# Patient Record
Sex: MALE | Race: WHITE | HISPANIC OR LATINO | Employment: FULL TIME | URBAN - METROPOLITAN AREA
[De-identification: names, ages, dates, MRNs, and addresses within clinical notes are randomized per-mention and may not be internally consistent; named-entity substitution may affect disease eponyms.]

---

## 2017-12-18 ENCOUNTER — OFFICE VISIT (OUTPATIENT)
Dept: GASTROENTEROLOGY | Facility: CLINIC | Age: 27
End: 2017-12-18
Attending: INTERNAL MEDICINE
Payer: COMMERCIAL

## 2017-12-18 VITALS
TEMPERATURE: 98.7 F | HEART RATE: 69 BPM | DIASTOLIC BLOOD PRESSURE: 80 MMHG | HEIGHT: 69 IN | BODY MASS INDEX: 23.36 KG/M2 | SYSTOLIC BLOOD PRESSURE: 133 MMHG | OXYGEN SATURATION: 98 % | WEIGHT: 157.7 LBS

## 2017-12-18 DIAGNOSIS — B18.1 CHRONIC VIRAL HEPATITIS B WITHOUT DELTA AGENT AND WITHOUT COMA (H): Primary | ICD-10-CM

## 2017-12-18 DIAGNOSIS — B18.1 CHRONIC VIRAL HEPATITIS B WITHOUT DELTA AGENT AND WITHOUT COMA (H): ICD-10-CM

## 2017-12-18 LAB
ALBUMIN SERPL-MCNC: 4.2 G/DL (ref 3.4–5)
ALP SERPL-CCNC: 98 U/L (ref 40–150)
ALT SERPL W P-5'-P-CCNC: 46 U/L (ref 0–70)
ANION GAP SERPL CALCULATED.3IONS-SCNC: 6 MMOL/L (ref 3–14)
AST SERPL W P-5'-P-CCNC: 29 U/L (ref 0–45)
BILIRUB DIRECT SERPL-MCNC: 0.2 MG/DL (ref 0–0.2)
BILIRUB SERPL-MCNC: 1 MG/DL (ref 0.2–1.3)
BUN SERPL-MCNC: 15 MG/DL (ref 7–30)
CALCIUM SERPL-MCNC: 8.7 MG/DL (ref 8.5–10.1)
CHLORIDE SERPL-SCNC: 105 MMOL/L (ref 94–109)
CO2 SERPL-SCNC: 29 MMOL/L (ref 20–32)
CREAT SERPL-MCNC: 0.95 MG/DL (ref 0.66–1.25)
ERYTHROCYTE [DISTWIDTH] IN BLOOD BY AUTOMATED COUNT: 11.8 % (ref 10–15)
GFR SERPL CREATININE-BSD FRML MDRD: >90 ML/MIN/1.7M2
GLUCOSE SERPL-MCNC: 97 MG/DL (ref 70–99)
HCT VFR BLD AUTO: 44.8 % (ref 40–53)
HGB BLD-MCNC: 15.5 G/DL (ref 13.3–17.7)
INR PPP: 1.04 (ref 0.86–1.14)
MCH RBC QN AUTO: 31 PG (ref 26.5–33)
MCHC RBC AUTO-ENTMCNC: 34.6 G/DL (ref 31.5–36.5)
MCV RBC AUTO: 90 FL (ref 78–100)
PLATELET # BLD AUTO: 179 10E9/L (ref 150–450)
POTASSIUM SERPL-SCNC: 3.7 MMOL/L (ref 3.4–5.3)
PROT SERPL-MCNC: 7.6 G/DL (ref 6.8–8.8)
RBC # BLD AUTO: 5 10E12/L (ref 4.4–5.9)
SODIUM SERPL-SCNC: 140 MMOL/L (ref 133–144)
WBC # BLD AUTO: 7.2 10E9/L (ref 4–11)

## 2017-12-18 PROCEDURE — 87517 HEPATITIS B DNA QUANT: CPT | Performed by: INTERNAL MEDICINE

## 2017-12-18 PROCEDURE — 85027 COMPLETE CBC AUTOMATED: CPT | Performed by: INTERNAL MEDICINE

## 2017-12-18 PROCEDURE — 86692 HEPATITIS DELTA AGENT ANTBDY: CPT | Performed by: INTERNAL MEDICINE

## 2017-12-18 PROCEDURE — 99212 OFFICE O/P EST SF 10 MIN: CPT | Mod: ZF

## 2017-12-18 PROCEDURE — 36415 COLL VENOUS BLD VENIPUNCTURE: CPT | Performed by: INTERNAL MEDICINE

## 2017-12-18 PROCEDURE — 85610 PROTHROMBIN TIME: CPT | Performed by: INTERNAL MEDICINE

## 2017-12-18 PROCEDURE — 80076 HEPATIC FUNCTION PANEL: CPT | Performed by: INTERNAL MEDICINE

## 2017-12-18 PROCEDURE — 80048 BASIC METABOLIC PNL TOTAL CA: CPT | Performed by: INTERNAL MEDICINE

## 2017-12-18 RX ORDER — ASCORBIC ACID 1000 MG
1 TABLET ORAL
COMMUNITY
End: 2021-04-02

## 2017-12-18 ASSESSMENT — PAIN SCALES - GENERAL: PAINLEVEL: NO PAIN (0)

## 2017-12-18 NOTE — MR AVS SNAPSHOT
"              After Visit Summary   12/18/2017    Saqib Holley    MRN: [id number]           Patient Information     Date Of Birth          1990        Visit Information        Provider Department      12/18/2017 4:45 PM Tripp Corona MD [facility] Hepatology        Today's Diagnoses     Chronic viral hepatitis B without delta agent and without coma (H) [B18.1]    -  1       Follow-ups after your visit        Follow-up notes from your care team     Return in about 1 year (around 12/18/2018).      Who to contact     If you have questions or need follow up information about today's clinic visit or your schedule please contact [facility]PAT[facility] directly at [phone].  Normal or non-critical lab and imaging results will be communicated to you by MyChart, letter or phone within 4 business days after the clinic has received the results. If you do not hear from us within 7 days, please contact the clinic through [employer] or phone. If you have a critical or abnormal lab result, we will notify you by phone as soon as possible.  Submit refill requests through [employer] or call your pharmacy and they will forward the refill request to us. Please allow 3 business days for your refill to be completed.          Additional Information About Your Visit        MyChart Information     [employer] gives you secure access to your electronic health record. If you see a primary care provider, you can also send messages to your care team and make appointments. If you have questions, please call your primary care clinic.  If you do not have a primary care provider, please call [phone] and they will assist you.        Care EveryWhere ID     This is your Care EveryWhere ID. This could be used by other organizations to access your [place] medical records  [id number]        Your Vitals Were     Pulse Temperature Height Pulse Oximetry BMI (Body Mass Index)       69 98.7  F (37.1  C) (Oral) 1.75 m (5' 8.9\") 98% 23.36 kg/m2        " Blood Pressure from Last 3 Encounters:   12/18/17 133/80   12/12/16 125/80   11/29/16 123/76    Weight from Last 3 Encounters:   12/18/17 71.5 kg (157 lb 11.2 oz)   12/12/16 75.8 kg (167 lb)   11/29/16 74.9 kg (165 lb 1.6 oz)              Today, you had the following     No orders found for display       Primary Care Provider Office Phone # Fax #    Osmar Alcantara [phone]       [address]  [facility] [address]        Equal Access to Services     [facility]: Hadii aad ku hadasho Soomaali, waaxda luqadaha, qaybta kaalmada adeegyada, flakito zhou adejames black . So [facility] [phone].    ATENCIÓN: Si habla español, tiene a madrid disposición servicios gratuitos de asistencia lingüística. Llame al [phone].    We comply with applicable federal civil rights laws and [place] laws. We do not discriminate on the basis of race, color, national origin, age, disability, sex, sexual orientation, or gender identity.            Thank you!     Thank you for choosing [facility]PAT[facility]  for your care. Our goal is always to provide you with excellent care. Hearing back from our patients is one way we can continue to improve our services. Please take a few minutes to complete the written survey that you may receive in the mail after your visit with us. Thank you!             Your Updated Medication List - Protect others around you: Learn how to safely use, store and throw away your medicines at www.disposemymeds.org.          This list is accurate as of: 12/18/17 11:59 PM.  Always use your most recent med list.                   Brand Name Dispense Instructions for use Diagnosis    Ginkgo Biloba 40 MG Tabs      Take 1 tablet by mouth        MULTIVITAMIN ADULTS PO      Take 1 tablet by mouth daily

## 2017-12-18 NOTE — NURSING NOTE
Chief Complaint   Patient presents with     RECHECK     Hepatitis C     Pt roomed, vitals, meds, and allergies reviewed with pt. Pt ready for provider.  Cheng Franco, CMA

## 2017-12-18 NOTE — LETTER
12/18/2017      RE: Saqib Holley  [address]  [facility] [address]       HISTORY OF PRESENT ILLNESS:  I had the pleasure of seeing Saqib Holley for followup in the Liver Clinic at the [facility] on 12/18/2017.  Mr. Holley returns for followup of chronic hepatitis B.      I saw him last year.  He had mildly increased transaminases with a borderline HBV DNA level.  He is hepatitis BE antigen negative and E antibody positive.      He is largely unchanged.  He denies any abdominal pain, itching or skin rash or fatigue.  He denies any increased abdominal girth or lower extremity edema.  He denies any fevers or chills, cough or shortness of breath.  He denies any nausea, vomiting, diarrhea or constipation.  His appetite has been good, and his weight has been stable.  Since he was last seen he has .     Current Outpatient Prescriptions   Medication     Multiple Vitamins-Minerals (MULTIVITAMIN ADULTS PO)     Ginkgo Biloba 40 MG TABS     No current facility-administered medications for this visit.      B/P: 133/80, T: 98.7, P: 69, R: Data Unavailable    HEENT exam shows no scleral icterus and no temporal muscle wasting.  His chest is clear.  His abdominal exam shows no increase in girth.  No masses or tenderness to palpation are present.  His liver is 10 cm in span without left lobe enlargement.  No spleen tip is palpable, and extremity exam shows no edema.  Skin exam shows no stigmata of chronic liver disease.  Neurologic exam shows no asterixis.     Recent Results (from the past 168 hour(s))   Hepatic panel [LAB20]    Collection Time: 12/18/17  4:03 PM   Result Value Ref Range    Bilirubin Direct 0.2 0.0 - 0.2 mg/dL    Bilirubin Total 1.0 0.2 - 1.3 mg/dL    Albumin 4.2 3.4 - 5.0 g/dL    Protein Total 7.6 6.8 - 8.8 g/dL    Alkaline Phosphatase 98 40 - 150 U/L    ALT 46 0 - 70 U/L    AST 29 0 - 45 U/L   Basic metabolic panel [LAB15]    Collection Time: 12/18/17  4:03 PM    Result Value Ref Range    Sodium 140 133 - 144 mmol/L    Potassium 3.7 3.4 - 5.3 mmol/L    Chloride 105 94 - 109 mmol/L    Carbon Dioxide 29 20 - 32 mmol/L    Anion Gap 6 3 - 14 mmol/L    Glucose 97 70 - 99 mg/dL    Urea Nitrogen 15 7 - 30 mg/dL    Creatinine 0.95 0.66 - 1.25 mg/dL    GFR Estimate >90 >60 mL/min/1.7m2    GFR Estimate If Black >90 >60 mL/min/1.7m2    Calcium 8.7 8.5 - 10.1 mg/dL   CBC with platelets [[id number]]    Collection Time: 12/18/17  4:03 PM   Result Value Ref Range    WBC 7.2 4.0 - 11.0 10e9/L    RBC Count 5.00 4.4 - 5.9 10e12/L    Hemoglobin 15.5 13.3 - 17.7 g/dL    Hematocrit 44.8 40.0 - 53.0 %    MCV 90 78 - 100 fl    MCH 31.0 26.5 - 33.0 pg    MCHC 34.6 31.5 - 36.5 g/dL    RDW 11.8 10.0 - 15.0 %    Platelet Count 179 150 - 450 10e9/L   INR [[id number]]    Collection Time: 12/18/17  4:03 PM   Result Value Ref Range    INR 1.04 0.86 - 1.14      My impression is that Mr. Holley has chronic hepatitis BE antigen (-), likely in the immune inactive phase of the disease.  His transaminase activities are actually better at this visit than they were last time and the only thing pending is his HBV DNA level.  If that is the same to improved, I certainly will not consider him for treatment and will simply see him back in the clinic in 1 year.      Thank you very much for allowing me to participate in the care of this patient.  If you have any questions regarding my recommendations, please do not hesitate to contact me.   Tirpp Corona MD

## 2017-12-18 NOTE — PROGRESS NOTES
HISTORY OF PRESENT ILLNESS:  I had the pleasure of seeing Saqib Holley for followup in the Liver Clinic at the [facility] on 12/18/2017.  Mr. Holley returns for followup of chronic hepatitis B.      I saw him last year.  He had mildly increased transaminases with a borderline HBV DNA level.  He is hepatitis BE antigen negative and E antibody positive.      He is largely unchanged.  He denies any abdominal pain, itching or skin rash or fatigue.  He denies any increased abdominal girth or lower extremity edema.  He denies any fevers or chills, cough or shortness of breath.  He denies any nausea, vomiting, diarrhea or constipation.  His appetite has been good, and his weight has been stable.  Since he was last seen he has .     Current Outpatient Prescriptions   Medication     Multiple Vitamins-Minerals (MULTIVITAMIN ADULTS PO)     Ginkgo Biloba 40 MG TABS     No current facility-administered medications for this visit.      B/P: 133/80, T: 98.7, P: 69, R: Data Unavailable    HEENT exam shows no scleral icterus and no temporal muscle wasting.  His chest is clear.  His abdominal exam shows no increase in girth.  No masses or tenderness to palpation are present.  His liver is 10 cm in span without left lobe enlargement.  No spleen tip is palpable, and extremity exam shows no edema.  Skin exam shows no stigmata of chronic liver disease.  Neurologic exam shows no asterixis.     Recent Results (from the past 168 hour(s))   Hepatic panel [LAB20]    Collection Time: 12/18/17  4:03 PM   Result Value Ref Range    Bilirubin Direct 0.2 0.0 - 0.2 mg/dL    Bilirubin Total 1.0 0.2 - 1.3 mg/dL    Albumin 4.2 3.4 - 5.0 g/dL    Protein Total 7.6 6.8 - 8.8 g/dL    Alkaline Phosphatase 98 40 - 150 U/L    ALT 46 0 - 70 U/L    AST 29 0 - 45 U/L   Basic metabolic panel [LAB15]    Collection Time: 12/18/17  4:03 PM   Result Value Ref Range    Sodium 140 133 - 144 mmol/L    Potassium 3.7 3.4 - 5.3  mmol/L    Chloride 105 94 - 109 mmol/L    Carbon Dioxide 29 20 - 32 mmol/L    Anion Gap 6 3 - 14 mmol/L    Glucose 97 70 - 99 mg/dL    Urea Nitrogen 15 7 - 30 mg/dL    Creatinine 0.95 0.66 - 1.25 mg/dL    GFR Estimate >90 >60 mL/min/1.7m2    GFR Estimate If Black >90 >60 mL/min/1.7m2    Calcium 8.7 8.5 - 10.1 mg/dL   CBC with platelets [[id number]]    Collection Time: 12/18/17  4:03 PM   Result Value Ref Range    WBC 7.2 4.0 - 11.0 10e9/L    RBC Count 5.00 4.4 - 5.9 10e12/L    Hemoglobin 15.5 13.3 - 17.7 g/dL    Hematocrit 44.8 40.0 - 53.0 %    MCV 90 78 - 100 fl    MCH 31.0 26.5 - 33.0 pg    MCHC 34.6 31.5 - 36.5 g/dL    RDW 11.8 10.0 - 15.0 %    Platelet Count 179 150 - 450 10e9/L   INR [[id number]]    Collection Time: 12/18/17  4:03 PM   Result Value Ref Range    INR 1.04 0.86 - 1.14      My impression is that Mr. Holley has chronic hepatitis BE antigen (-), likely in the immune inactive phase of the disease.  His transaminase activities are actually better at this visit than they were last time and the only thing pending is his HBV DNA level.  If that is the same to improved, I certainly will not consider him for treatment and will simply see him back in the clinic in 1 year.      Thank you very much for allowing me to participate in the care of this patient.  If you have any questions regarding my recommendations, please do not hesitate to contact me.       Tripp Corona MD      Professor of Medicine  [employer]      Executive Medical Director, Solid Organ Transplant Program  [facility]

## 2017-12-19 LAB
HBV DNA SERPL NAA+PROBE-ACNC: ABNORMAL [IU]/ML
HBV DNA SERPL NAA+PROBE-LOG IU: 4.3 {LOG_IU}/ML

## 2017-12-20 LAB — HDV AB SER QL: NEGATIVE

## 2018-12-10 ENCOUNTER — OFFICE VISIT (OUTPATIENT)
Dept: GASTROENTEROLOGY | Facility: CLINIC | Age: 28
End: 2018-12-10
Attending: INTERNAL MEDICINE

## 2018-12-10 VITALS
HEIGHT: 68 IN | SYSTOLIC BLOOD PRESSURE: 131 MMHG | TEMPERATURE: 98.8 F | DIASTOLIC BLOOD PRESSURE: 79 MMHG | BODY MASS INDEX: 23.73 KG/M2 | WEIGHT: 156.6 LBS | HEART RATE: 72 BPM

## 2018-12-10 DIAGNOSIS — B18.1 CHRONIC VIRAL HEPATITIS B WITHOUT DELTA AGENT AND WITHOUT COMA (H): ICD-10-CM

## 2018-12-10 DIAGNOSIS — B18.1 CHRONIC VIRAL HEPATITIS B WITHOUT DELTA AGENT AND WITHOUT COMA (H): Primary | ICD-10-CM

## 2018-12-10 LAB
ALBUMIN SERPL-MCNC: 4.2 G/DL (ref 3.4–5)
ALP SERPL-CCNC: 99 U/L (ref 40–150)
ALT SERPL W P-5'-P-CCNC: 63 U/L (ref 0–70)
ANION GAP SERPL CALCULATED.3IONS-SCNC: 8 MMOL/L (ref 3–14)
AST SERPL W P-5'-P-CCNC: 39 U/L (ref 0–45)
BILIRUB DIRECT SERPL-MCNC: 0.2 MG/DL (ref 0–0.2)
BILIRUB SERPL-MCNC: 1.2 MG/DL (ref 0.2–1.3)
BUN SERPL-MCNC: 12 MG/DL (ref 7–30)
CALCIUM SERPL-MCNC: 8.8 MG/DL (ref 8.5–10.1)
CHLORIDE SERPL-SCNC: 102 MMOL/L (ref 94–109)
CO2 SERPL-SCNC: 28 MMOL/L (ref 20–32)
CREAT SERPL-MCNC: 0.99 MG/DL (ref 0.66–1.25)
ERYTHROCYTE [DISTWIDTH] IN BLOOD BY AUTOMATED COUNT: 11.9 % (ref 10–15)
GFR SERPL CREATININE-BSD FRML MDRD: 90 ML/MIN/1.7M2
GLUCOSE SERPL-MCNC: 78 MG/DL (ref 70–99)
HCT VFR BLD AUTO: 45.3 % (ref 40–53)
HGB BLD-MCNC: 15.7 G/DL (ref 13.3–17.7)
INR PPP: 1.01 (ref 0.86–1.14)
MCH RBC QN AUTO: 31.2 PG (ref 26.5–33)
MCHC RBC AUTO-ENTMCNC: 34.7 G/DL (ref 31.5–36.5)
MCV RBC AUTO: 90 FL (ref 78–100)
PLATELET # BLD AUTO: 201 10E9/L (ref 150–450)
POTASSIUM SERPL-SCNC: 3.6 MMOL/L (ref 3.4–5.3)
PROT SERPL-MCNC: 8 G/DL (ref 6.8–8.8)
RBC # BLD AUTO: 5.03 10E12/L (ref 4.4–5.9)
SODIUM SERPL-SCNC: 139 MMOL/L (ref 133–144)
WBC # BLD AUTO: 6.4 10E9/L (ref 4–11)

## 2018-12-10 PROCEDURE — 80076 HEPATIC FUNCTION PANEL: CPT | Performed by: INTERNAL MEDICINE

## 2018-12-10 PROCEDURE — 85610 PROTHROMBIN TIME: CPT | Performed by: INTERNAL MEDICINE

## 2018-12-10 PROCEDURE — 85027 COMPLETE CBC AUTOMATED: CPT | Performed by: INTERNAL MEDICINE

## 2018-12-10 PROCEDURE — G0463 HOSPITAL OUTPT CLINIC VISIT: HCPCS | Mod: ZF

## 2018-12-10 PROCEDURE — 80048 BASIC METABOLIC PNL TOTAL CA: CPT | Performed by: INTERNAL MEDICINE

## 2018-12-10 PROCEDURE — 36415 COLL VENOUS BLD VENIPUNCTURE: CPT | Performed by: INTERNAL MEDICINE

## 2018-12-10 PROCEDURE — 87517 HEPATITIS B DNA QUANT: CPT | Performed by: INTERNAL MEDICINE

## 2018-12-10 RX ORDER — CHOLECALCIFEROL (VITAMIN D3) 50 MCG
1 TABLET ORAL DAILY
COMMUNITY
End: 2024-06-06

## 2018-12-10 RX ORDER — MULTIVITAMIN WITH IRON
1 TABLET ORAL DAILY
COMMUNITY
End: 2024-06-06

## 2018-12-10 ASSESSMENT — MIFFLIN-ST. JEOR: SCORE: 1659.83

## 2018-12-10 ASSESSMENT — PAIN SCALES - GENERAL: PAINLEVEL: MILD PAIN (2)

## 2018-12-10 NOTE — PROGRESS NOTES
"I had the pleasure of seeing Saqib Holley for followup in the Liver Clinic at the [facility] [place] on 12/10/2018.  Mr. Holley returns for followup of chronic hepatitis B.  He has been to date in the immune inactive phase.      He feels well at this visit.  He denies any abdominal pain, itching or skin rash or fatigue.  He denies any increased abdominal girth or lower extremity edema.  He denies any fevers or chills, cough or shortness of breath.  He denies any nausea, vomiting, diarrhea or constipation.  His appetite has been good, and his weight has been stable.  There have been no other new events since he was last seen.     Current Outpatient Medications   Medication     magnesium 250 MG tablet     Multiple Vitamins-Minerals (MULTIVITAMIN ADULTS PO)     vitamin D3 (CHOLECALCIFEROL) 2000 units tablet     Ginkgo Biloba 40 MG TABS     No current facility-administered medications for this visit.      Vital signs:  Temp: 98.8  F (37.1  C) Temp src: Oral BP: 131/79 Pulse: 72           Height: 172.7 cm (5' 8\") Weight: 71 kg (156 lb 9.6 oz)  Estimated body mass index is 23.81 kg/m  as calculated from the following:    Height as of this encounter: 1.727 m (5' 8\").    Weight as of this encounter: 71 kg (156 lb 9.6 oz).    HEENT exam shows no scleral icterus or temporal muscle wasting.  His chest is clear.  His abdominal exam shows no increase in girth.  No masses or tenderness to palpation are present.  His liver is 10 cm in span without left lobe enlargement.  No spleen tip is palpable, and extremity exam shows no edema.  Skin exam shows no stigmata of chronic liver disease.  Neurologic exam shows no asterixis.     Recent Results (from the past 168 hour(s))   CBC with platelets [[id number]]    Collection Time: 12/10/18  4:14 PM   Result Value Ref Range    WBC 6.4 4.0 - 11.0 10e9/L    RBC Count 5.03 4.4 - 5.9 10e12/L    Hemoglobin 15.7 13.3 - 17.7 g/dL    Hematocrit 45.3 40.0 - 53.0 %    MCV 90 78 - " 100 fl    MCH 31.2 26.5 - 33.0 pg    MCHC 34.7 31.5 - 36.5 g/dL    RDW 11.9 10.0 - 15.0 %    Platelet Count 201 150 - 450 10e9/L   INR [[id number]]    Collection Time: 12/10/18  4:14 PM   Result Value Ref Range    INR 1.01 0.86 - 1.14   Basic metabolic panel [LAB15]    Collection Time: 12/10/18  4:14 PM   Result Value Ref Range    Sodium 139 133 - 144 mmol/L    Potassium 3.6 3.4 - 5.3 mmol/L    Chloride 102 94 - 109 mmol/L    Carbon Dioxide 28 20 - 32 mmol/L    Anion Gap 8 3 - 14 mmol/L    Glucose 78 70 - 99 mg/dL    Urea Nitrogen 12 7 - 30 mg/dL    Creatinine 0.99 0.66 - 1.25 mg/dL    GFR Estimate 90 >60 mL/min/1.7m2    GFR Estimate If Black >90 >60 mL/min/1.7m2    Calcium 8.8 8.5 - 10.1 mg/dL   Hepatic panel [LAB20]    Collection Time: 12/10/18  4:14 PM   Result Value Ref Range    Bilirubin Direct 0.2 0.0 - 0.2 mg/dL    Bilirubin Total 1.2 0.2 - 1.3 mg/dL    Albumin 4.2 3.4 - 5.0 g/dL    Protein Total 8.0 6.8 - 8.8 g/dL    Alkaline Phosphatase 99 40 - 150 U/L    ALT 63 0 - 70 U/L    AST 39 0 - 45 U/L      My impression is that Mr. Holley has chronic hepatitis B.  His transaminases remain normal, and his HBV DNA level is pending.  I will follow up on that when it is available, and assuming it remains less than 20,000, we will continue on this course of no treatment for the time being and simply seeing him once a year.  He did request that we set the appointments for June, so I will see him back in the clinic in 6 months and then 1 year thereafter.      Thank you very much for allowing me to participate in the care of this patient.  If you have any questions regarding my recommendations, please do not hesitate to contact me.       Tripp Corona MD      Professor of Medicine  [facility] [employer]      Executive Medical Director, Solid Organ Transplant Program  [facility]

## 2018-12-10 NOTE — LETTER
"12/10/2018      RE: Saqib Holely  [address]  [place] [address]       I had the pleasure of seeing Saqib Holley for followup in the Liver Clinic at the [facility] on 12/10/2018.  Mr. Holley returns for followup of chronic hepatitis B.  He has been to date in the immune inactive phase.      He feels well at this visit.  He denies any abdominal pain, itching or skin rash or fatigue.  He denies any increased abdominal girth or lower extremity edema.  He denies any fevers or chills, cough or shortness of breath.  He denies any nausea, vomiting, diarrhea or constipation.  His appetite has been good, and his weight has been stable.  There have been no other new events since he was last seen.     Current Outpatient Medications   Medication     magnesium 250 MG tablet     Multiple Vitamins-Minerals (MULTIVITAMIN ADULTS PO)     vitamin D3 (CHOLECALCIFEROL) 2000 units tablet     Ginkgo Biloba 40 MG TABS     No current facility-administered medications for this visit.      Vital signs:  Temp: 98.8  F (37.1  C) Temp src: Oral BP: 131/79 Pulse: 72           Height: 172.7 cm (5' 8\") Weight: 71 kg (156 lb 9.6 oz)  Estimated body mass index is 23.81 kg/m  as calculated from the following:    Height as of this encounter: 1.727 m (5' 8\").    Weight as of this encounter: 71 kg (156 lb 9.6 oz).    HEENT exam shows no scleral icterus or temporal muscle wasting.  His chest is clear.  His abdominal exam shows no increase in girth.  No masses or tenderness to palpation are present.  His liver is 10 cm in span without left lobe enlargement.  No spleen tip is palpable, and extremity exam shows no edema.  Skin exam shows no stigmata of chronic liver disease.  Neurologic exam shows no asterixis.     Recent Results (from the past 168 hour(s))   CBC with platelets [[id number]]    Collection Time: 12/10/18  4:14 PM   Result Value Ref Range    WBC 6.4 4.0 - 11.0 10e9/L    RBC Count 5.03 4.4 - 5.9 " 10e12/L    Hemoglobin 15.7 13.3 - 17.7 g/dL    Hematocrit 45.3 40.0 - 53.0 %    MCV 90 78 - 100 fl    MCH 31.2 26.5 - 33.0 pg    MCHC 34.7 31.5 - 36.5 g/dL    RDW 11.9 10.0 - 15.0 %    Platelet Count 201 150 - 450 10e9/L   INR [[id number]]    Collection Time: 12/10/18  4:14 PM   Result Value Ref Range    INR 1.01 0.86 - 1.14   Basic metabolic panel [LAB15]    Collection Time: 12/10/18  4:14 PM   Result Value Ref Range    Sodium 139 133 - 144 mmol/L    Potassium 3.6 3.4 - 5.3 mmol/L    Chloride 102 94 - 109 mmol/L    Carbon Dioxide 28 20 - 32 mmol/L    Anion Gap 8 3 - 14 mmol/L    Glucose 78 70 - 99 mg/dL    Urea Nitrogen 12 7 - 30 mg/dL    Creatinine 0.99 0.66 - 1.25 mg/dL    GFR Estimate 90 >60 mL/min/1.7m2    GFR Estimate If Black >90 >60 mL/min/1.7m2    Calcium 8.8 8.5 - 10.1 mg/dL   Hepatic panel [LAB20]    Collection Time: 12/10/18  4:14 PM   Result Value Ref Range    Bilirubin Direct 0.2 0.0 - 0.2 mg/dL    Bilirubin Total 1.2 0.2 - 1.3 mg/dL    Albumin 4.2 3.4 - 5.0 g/dL    Protein Total 8.0 6.8 - 8.8 g/dL    Alkaline Phosphatase 99 40 - 150 U/L    ALT 63 0 - 70 U/L    AST 39 0 - 45 U/L      My impression is that Mr. Holley has chronic hepatitis B.  His transaminases remain normal, and his HBV DNA level is pending.  I will follow up on that when it is available, and assuming it remains less than 20,000, we will continue on this course of no treatment for the time being and simply seeing him once a year.  He did request that we set the appointments for June, so I will see him back in the clinic in 6 months and then 1 year thereafter.      Thank you very much for allowing me to participate in the care of this patient.  If you have any questions regarding my recommendations, please do not hesitate to contact me.       Tripp Corona MD      Professor of Medicine  [employer] [facility] [employer]      Executive Medical Director, Solid Organ Transplant Program  [facility]

## 2018-12-10 NOTE — NURSING NOTE
"/79   Pulse 72   Temp 98.8  F (37.1  C) (Oral)   Ht 1.727 m (5' 8\")   Wt 71 kg (156 lb 9.6 oz)   BMI 23.81 kg/m    Chief Complaint   Patient presents with     RECHECK     follow up with Hep C, micaelaimfiorella cma       "

## 2018-12-11 LAB
HBV DNA SERPL NAA+PROBE-ACNC: ABNORMAL [IU]/ML
HBV DNA SERPL NAA+PROBE-LOG IU: 4.5 {LOG_IU}/ML

## 2018-12-16 ENCOUNTER — MYC MEDICAL ADVICE (OUTPATIENT)
Dept: GASTROENTEROLOGY | Facility: CLINIC | Age: 28
End: 2018-12-16

## 2018-12-16 DIAGNOSIS — B18.1 VIRAL HEPATITIS B CHRONIC (H): Primary | ICD-10-CM

## 2018-12-17 RX ORDER — ENTECAVIR 0.5 MG/1
0.5 TABLET, FILM COATED ORAL DAILY
Qty: 90 TABLET | Refills: 3 | Status: SHIPPED | OUTPATIENT
Start: 2018-12-17 | End: 2021-04-02

## 2019-06-24 ENCOUNTER — MYC REFILL (OUTPATIENT)
Dept: GASTROENTEROLOGY | Facility: CLINIC | Age: 29
End: 2019-06-24

## 2019-06-24 ENCOUNTER — OFFICE VISIT (OUTPATIENT)
Dept: GASTROENTEROLOGY | Facility: CLINIC | Age: 29
End: 2019-06-24
Attending: INTERNAL MEDICINE

## 2019-06-24 VITALS
HEIGHT: 67 IN | OXYGEN SATURATION: 100 % | DIASTOLIC BLOOD PRESSURE: 75 MMHG | WEIGHT: 154.2 LBS | HEART RATE: 64 BPM | TEMPERATURE: 98.4 F | BODY MASS INDEX: 24.2 KG/M2 | SYSTOLIC BLOOD PRESSURE: 126 MMHG

## 2019-06-24 DIAGNOSIS — B18.1 CHRONIC VIRAL HEPATITIS B WITHOUT DELTA AGENT AND WITHOUT COMA (H): ICD-10-CM

## 2019-06-24 DIAGNOSIS — B18.1 CHRONIC VIRAL HEPATITIS B WITHOUT DELTA AGENT AND WITHOUT COMA (H): Primary | ICD-10-CM

## 2019-06-24 LAB
AFP SERPL-MCNC: <1.5 UG/L (ref 0–8)
ALBUMIN SERPL-MCNC: 4 G/DL (ref 3.4–5)
ALP SERPL-CCNC: 100 U/L (ref 40–150)
ALT SERPL W P-5'-P-CCNC: 78 U/L (ref 0–70)
ANION GAP SERPL CALCULATED.3IONS-SCNC: 4 MMOL/L (ref 3–14)
AST SERPL W P-5'-P-CCNC: 42 U/L (ref 0–45)
BILIRUB DIRECT SERPL-MCNC: 0.3 MG/DL (ref 0–0.2)
BILIRUB SERPL-MCNC: 1.7 MG/DL (ref 0.2–1.3)
BUN SERPL-MCNC: 15 MG/DL (ref 7–30)
CALCIUM SERPL-MCNC: 8.7 MG/DL (ref 8.5–10.1)
CHLORIDE SERPL-SCNC: 104 MMOL/L (ref 94–109)
CO2 SERPL-SCNC: 30 MMOL/L (ref 20–32)
CREAT SERPL-MCNC: 0.89 MG/DL (ref 0.66–1.25)
ERYTHROCYTE [DISTWIDTH] IN BLOOD BY AUTOMATED COUNT: 11.3 % (ref 10–15)
GFR SERPL CREATININE-BSD FRML MDRD: >90 ML/MIN/{1.73_M2}
GLUCOSE SERPL-MCNC: 90 MG/DL (ref 70–99)
HCT VFR BLD AUTO: 47.8 % (ref 40–53)
HGB BLD-MCNC: 16.2 G/DL (ref 13.3–17.7)
INR PPP: 1.02 (ref 0.86–1.14)
MCH RBC QN AUTO: 31.4 PG (ref 26.5–33)
MCHC RBC AUTO-ENTMCNC: 33.9 G/DL (ref 31.5–36.5)
MCV RBC AUTO: 93 FL (ref 78–100)
PLATELET # BLD AUTO: 181 10E9/L (ref 150–450)
POTASSIUM SERPL-SCNC: 3.8 MMOL/L (ref 3.4–5.3)
PROT SERPL-MCNC: 7.9 G/DL (ref 6.8–8.8)
RBC # BLD AUTO: 5.16 10E12/L (ref 4.4–5.9)
SODIUM SERPL-SCNC: 138 MMOL/L (ref 133–144)
WBC # BLD AUTO: 5.9 10E9/L (ref 4–11)

## 2019-06-24 PROCEDURE — 36415 COLL VENOUS BLD VENIPUNCTURE: CPT | Performed by: INTERNAL MEDICINE

## 2019-06-24 PROCEDURE — 80076 HEPATIC FUNCTION PANEL: CPT | Performed by: INTERNAL MEDICINE

## 2019-06-24 PROCEDURE — 82105 ALPHA-FETOPROTEIN SERUM: CPT | Performed by: INTERNAL MEDICINE

## 2019-06-24 PROCEDURE — 80048 BASIC METABOLIC PNL TOTAL CA: CPT | Performed by: INTERNAL MEDICINE

## 2019-06-24 PROCEDURE — 85027 COMPLETE CBC AUTOMATED: CPT | Performed by: INTERNAL MEDICINE

## 2019-06-24 PROCEDURE — 87517 HEPATITIS B DNA QUANT: CPT | Performed by: INTERNAL MEDICINE

## 2019-06-24 PROCEDURE — 85610 PROTHROMBIN TIME: CPT | Performed by: INTERNAL MEDICINE

## 2019-06-24 PROCEDURE — G0463 HOSPITAL OUTPT CLINIC VISIT: HCPCS | Mod: ZF

## 2019-06-24 RX ORDER — TENOFOVIR DISOPROXIL FUMARATE 300 MG/1
300 TABLET, FILM COATED ORAL DAILY
Qty: 90 TABLET | Refills: 3 | Status: SHIPPED | OUTPATIENT
Start: 2019-06-24 | End: 2019-06-24

## 2019-06-24 RX ORDER — TENOFOVIR DISOPROXIL FUMARATE 300 MG/1
300 TABLET, FILM COATED ORAL DAILY
Qty: 90 TABLET | Refills: 3 | Status: SHIPPED | OUTPATIENT
Start: 2019-06-24 | End: 2020-06-09

## 2019-06-24 ASSESSMENT — PAIN SCALES - GENERAL: PAINLEVEL: NO PAIN (0)

## 2019-06-24 ASSESSMENT — MIFFLIN-ST. JEOR: SCORE: 1628.08

## 2019-06-24 NOTE — PROGRESS NOTES
"HEPATOLOGY CLINIC VISIT      REASON FOR CONSULTATION: follow up chronic hepatitis C    HPI: 29 year old male with history of chronic hepatitis C (hepBe agn negative) who is coming in today for follow up. His ROS is negative for increased abdominal girth, fatigue, abdominal pain, jaundice, diarrhea/constipation, or GI bleed. He comes today with his partner and they are expecting their first child.     He was last seen by Dr. Corona 6 months ago, hep B DNA then was 29,000. They have not yet started tenofovir and want to discuss medication side effects today.     ROS: 10pt ROS performed and otherwise negative.    PERTINENT PAST MEDICAL/SURGICAL HISTORY:  As noted above.    PERTINENT MEDICATIONS:    Medications reviewed with patient today, see Medication List/Assessment for details.  No other NSAID/anticoagulation reported by patient.  No other OTC/herbal/supplements reported by patient.    SOCIAL HISTORY: Tobacco: denies. Alcohol: denies.     PHYSICAL EXAMINATION:  Vitals reviewed  /75   Pulse 64   Temp 98.4  F (36.9  C) (Oral)   Ht 1.702 m (5' 7\")   Wt 69.9 kg (154 lb 3.2 oz)   SpO2 100%   BMI 24.15 kg/m    Wt Readings from Last 2 Encounters:   06/24/19 69.9 kg (154 lb 3.2 oz)   12/10/18 71 kg (156 lb 9.6 oz)       Gen: aaox3, cooperative, pleasant, not diaphoretic, nad  HEENT: ncat, neck supple, no clad/sclad, normal op w/o ulcer/exudate, anicteric, mmm  Resp/CV without acute findings, not dyspneic/tachycardic  Abd: soft, non tender, no masses.   Ext: no c/c/e  Skin: warm, perfused, no jaundice  Neuro: grossly intact, no asterixis noted    PERTINENT STUDIES:  Alk phos: 100  ALT: 78  AST: 42  T bili: 0.3  CBC and BMP: unremarkable.   INR: 1.02  AFP: in process  Hep B BNA: in process    ASSESSMENT/PLAN:    29 year old male with history of chronic hepatitis B, his last hepatitis B DNA 6 months ago was >20K and at that time his ALT was 63 treatment was recommended. He hasn't started any medications yet because " he wanted to discuss side effects with Dr. Corona today. Hep B DNA drawn today is still in process but his ALT is increased to 78. Tenofovir was discussed with the patient and his wife and they are ok with starting it. He recently changed his prescription and might have to pay out of pocket; tenofovir was sent to his pharmacy and they will reach out to Dr. Corona if they cant afford the copay.     Patient seen and discussed with staff GI physician, Dr. Corona, who agrees with my assessment and plan.      Lalita Faith MD  Internal Medicine-PGY2    The patient was seen and examined.  The above assessment and plan was developed jointly with the resident.      Tripp Corona MD      Professor of Medicine  [employer]      Executive Medical Director, Solid Organ Transplant Program  [facility]

## 2019-06-24 NOTE — NURSING NOTE
"Chief Complaint   Patient presents with     RECHECK     Viral hepatitis B chronic      /75   Pulse 64   Temp 98.4  F (36.9  C) (Oral)   Ht 1.702 m (5' 7\")   Wt 69.9 kg (154 lb 3.2 oz)   SpO2 100%   BMI 24.15 kg/m    Alva Alcaraz [facility]  6/24/2019 1:28 PM      "

## 2019-06-24 NOTE — TELEPHONE ENCOUNTER
Patient requesting tenofovir be refilled at [facility] instead of [facility].     Karime Zimmerman,LPN  Hepatology Clinic

## 2019-06-24 NOTE — LETTER
"6/24/2019      RE: Saqib Holley  [address]  [facility] [address]       HEPATOLOGY CLINIC VISIT      REASON FOR CONSULTATION: follow up chronic hepatitis C    HPI: 29 year old male with history of chronic hepatitis C (hepBe agn negative) who is coming in today for follow up. His ROS is negative for increased abdominal girth, fatigue, abdominal pain, jaundice, diarrhea/constipation, or GI bleed. He comes today with his partner and they are expecting their first child.     He was last seen by Dr. Corona 6 months ago, hep B DNA then was 29,000. They have not yet started tenofovir and want to discuss medication side effects today.     ROS: 10pt ROS performed and otherwise negative.    PERTINENT PAST MEDICAL/SURGICAL HISTORY:  As noted above.    PERTINENT MEDICATIONS:    Medications reviewed with patient today, see Medication List/Assessment for details.  No other NSAID/anticoagulation reported by patient.  No other OTC/herbal/supplements reported by patient.    SOCIAL HISTORY: Tobacco: denies. Alcohol: denies.     PHYSICAL EXAMINATION:  Vitals reviewed  /75   Pulse 64   Temp 98.4  F (36.9  C) (Oral)   Ht 1.702 m (5' 7\")   Wt 69.9 kg (154 lb 3.2 oz)   SpO2 100%   BMI 24.15 kg/m     Wt Readings from Last 2 Encounters:   06/24/19 69.9 kg (154 lb 3.2 oz)   12/10/18 71 kg (156 lb 9.6 oz)       Gen: aaox3, cooperative, pleasant, not diaphoretic, nad  HEENT: ncat, neck supple, no clad/sclad, normal op w/o ulcer/exudate, anicteric, mmm  Resp/CV without acute findings, not dyspneic/tachycardic  Abd: soft, non tender, no masses.   Ext: no c/c/e  Skin: warm, perfused, no jaundice  Neuro: grossly intact, no asterixis noted    PERTINENT STUDIES:  Alk phos: 100  ALT: 78  AST: 42  T bili: 0.3  CBC and BMP: unremarkable.   INR: 1.02  AFP: in process  Hep B BNA: in process    ASSESSMENT/PLAN:    29 year old male with history of chronic hepatitis B, his last hepatitis B DNA 6 months ago was >20K and at that time " his ALT was 63 treatment was recommended. He hasn't started any medications yet because he wanted to discuss side effects with Dr. Corona today. Hep B DNA drawn today is still in process but his ALT is increased to 78. Tenofovir was discussed with the patient and his wife and they are ok with starting it. He recently changed his prescription and might have to pay out of pocket; tenofovir was sent to his pharmacy and they will reach out to Dr. Corona if they cant afford the copay.     Patient seen and discussed with staff GI physician, Dr. Corona, who agrees with my assessment and plan.      Lalita Faith MD  Internal Medicine-PGY2    The patient was seen and examined.  The above assessment and plan was developed jointly with the resident.      Tripp Corona MD      Professor of Medicine  [employer]      Executive Medical Director, Solid Organ Transplant Program  [facility]    Tripp Corona MD

## 2019-06-25 LAB
HBV DNA SERPL NAA+PROBE-ACNC: ABNORMAL [IU]/ML
HBV DNA SERPL NAA+PROBE-LOG IU: 4.5 {LOG_IU}/ML

## 2019-06-28 ENCOUNTER — TELEPHONE (OUTPATIENT)
Dept: GASTROENTEROLOGY | Facility: CLINIC | Age: 29
End: 2019-06-28

## 2019-09-17 ENCOUNTER — MYC REFILL (OUTPATIENT)
Dept: GASTROENTEROLOGY | Facility: CLINIC | Age: 29
End: 2019-09-17

## 2019-09-17 DIAGNOSIS — B18.1 CHRONIC VIRAL HEPATITIS B WITHOUT DELTA AGENT AND WITHOUT COMA (H): ICD-10-CM

## 2019-09-18 RX ORDER — TENOFOVIR DISOPROXIL FUMARATE 300 MG/1
300 TABLET, FILM COATED ORAL DAILY
Qty: 90 TABLET | Refills: 3 | OUTPATIENT
Start: 2019-09-18

## 2019-09-23 DIAGNOSIS — B18.1 CHRONIC VIRAL HEPATITIS B WITHOUT DELTA AGENT AND WITHOUT COMA (H): ICD-10-CM

## 2019-09-23 LAB
AFP SERPL-MCNC: <1.5 UG/L (ref 0–8)
ALBUMIN SERPL-MCNC: 4.1 G/DL (ref 3.4–5)
ALP SERPL-CCNC: 99 U/L (ref 40–150)
ALT SERPL W P-5'-P-CCNC: 52 U/L (ref 0–70)
ANION GAP SERPL CALCULATED.3IONS-SCNC: 7 MMOL/L (ref 3–14)
AST SERPL W P-5'-P-CCNC: 38 U/L (ref 0–45)
BILIRUB DIRECT SERPL-MCNC: 0.2 MG/DL (ref 0–0.2)
BILIRUB SERPL-MCNC: 1.6 MG/DL (ref 0.2–1.3)
BUN SERPL-MCNC: 13 MG/DL (ref 7–30)
CALCIUM SERPL-MCNC: 9.2 MG/DL (ref 8.5–10.1)
CHLORIDE SERPL-SCNC: 107 MMOL/L (ref 94–109)
CO2 SERPL-SCNC: 27 MMOL/L (ref 20–32)
CREAT SERPL-MCNC: 0.86 MG/DL (ref 0.66–1.25)
ERYTHROCYTE [DISTWIDTH] IN BLOOD BY AUTOMATED COUNT: 12.2 % (ref 10–15)
GFR SERPL CREATININE-BSD FRML MDRD: >90 ML/MIN/{1.73_M2}
GLUCOSE SERPL-MCNC: 104 MG/DL (ref 70–99)
HCT VFR BLD AUTO: 45.1 % (ref 40–53)
HGB BLD-MCNC: 15.9 G/DL (ref 13.3–17.7)
INR PPP: 0.93 (ref 0.86–1.14)
MCH RBC QN AUTO: 31.7 PG (ref 26.5–33)
MCHC RBC AUTO-ENTMCNC: 35.3 G/DL (ref 31.5–36.5)
MCV RBC AUTO: 90 FL (ref 78–100)
PLATELET # BLD AUTO: 180 10E9/L (ref 150–450)
POTASSIUM SERPL-SCNC: 4 MMOL/L (ref 3.4–5.3)
PROT SERPL-MCNC: 7.6 G/DL (ref 6.8–8.8)
RBC # BLD AUTO: 5.01 10E12/L (ref 4.4–5.9)
SODIUM SERPL-SCNC: 141 MMOL/L (ref 133–144)
WBC # BLD AUTO: 6.6 10E9/L (ref 4–11)

## 2019-09-23 PROCEDURE — 80076 HEPATIC FUNCTION PANEL: CPT | Performed by: INTERNAL MEDICINE

## 2019-09-23 PROCEDURE — 85610 PROTHROMBIN TIME: CPT | Performed by: INTERNAL MEDICINE

## 2019-09-23 PROCEDURE — 80048 BASIC METABOLIC PNL TOTAL CA: CPT | Performed by: INTERNAL MEDICINE

## 2019-09-23 PROCEDURE — 36415 COLL VENOUS BLD VENIPUNCTURE: CPT | Performed by: INTERNAL MEDICINE

## 2019-09-23 PROCEDURE — 82105 ALPHA-FETOPROTEIN SERUM: CPT | Performed by: INTERNAL MEDICINE

## 2019-09-23 PROCEDURE — 87517 HEPATITIS B DNA QUANT: CPT | Performed by: INTERNAL MEDICINE

## 2019-09-23 PROCEDURE — 85027 COMPLETE CBC AUTOMATED: CPT | Performed by: INTERNAL MEDICINE

## 2019-09-25 LAB
HBV DNA SERPL NAA+PROBE-ACNC: <20 [IU]/ML
HBV DNA SERPL NAA+PROBE-LOG IU: <1.3 {LOG_IU}/ML

## 2020-03-10 ENCOUNTER — HEALTH MAINTENANCE LETTER (OUTPATIENT)
Age: 30
End: 2020-03-10

## 2020-05-27 ENCOUNTER — TELEPHONE (OUTPATIENT)
Dept: GASTROENTEROLOGY | Facility: CLINIC | Age: 30
End: 2020-05-27

## 2020-05-27 NOTE — TELEPHONE ENCOUNTER
Lab orders placed.  Patient notified.    [facility]    Phone Message    May a detailed message be left on voicemail: yes     Reason for Call: Other: Patient would like to have orders sent to [facility] before 06/09. Please follow up on this request, Thanks     Action Taken: Message routed to:  [facility] ([facility] GI    Travel Screening: Not Applicable

## 2020-05-28 DIAGNOSIS — B18.1 CHRONIC VIRAL HEPATITIS B WITHOUT DELTA AGENT AND WITHOUT COMA (H): Primary | ICD-10-CM

## 2020-06-03 DIAGNOSIS — B18.1 CHRONIC VIRAL HEPATITIS B WITHOUT DELTA AGENT AND WITHOUT COMA (H): ICD-10-CM

## 2020-06-03 LAB
ALBUMIN SERPL-MCNC: 4 G/DL (ref 3.4–5)
ALP SERPL-CCNC: 100 U/L (ref 40–150)
ALT SERPL W P-5'-P-CCNC: 56 U/L (ref 0–70)
ANION GAP SERPL CALCULATED.3IONS-SCNC: 6 MMOL/L (ref 3–14)
AST SERPL W P-5'-P-CCNC: 35 U/L (ref 0–45)
BILIRUB DIRECT SERPL-MCNC: 0.2 MG/DL (ref 0–0.2)
BILIRUB SERPL-MCNC: 1.5 MG/DL (ref 0.2–1.3)
BUN SERPL-MCNC: 18 MG/DL (ref 7–30)
CALCIUM SERPL-MCNC: 9.1 MG/DL (ref 8.5–10.1)
CHLORIDE SERPL-SCNC: 104 MMOL/L (ref 94–109)
CO2 SERPL-SCNC: 29 MMOL/L (ref 20–32)
CREAT SERPL-MCNC: 0.84 MG/DL (ref 0.66–1.25)
ERYTHROCYTE [DISTWIDTH] IN BLOOD BY AUTOMATED COUNT: 11.9 % (ref 10–15)
GFR SERPL CREATININE-BSD FRML MDRD: >90 ML/MIN/{1.73_M2}
GLUCOSE SERPL-MCNC: 99 MG/DL (ref 70–99)
HCT VFR BLD AUTO: 43.6 % (ref 40–53)
HGB BLD-MCNC: 15.1 G/DL (ref 13.3–17.7)
INR PPP: 1.02 (ref 0.86–1.14)
MCH RBC QN AUTO: 30.4 PG (ref 26.5–33)
MCHC RBC AUTO-ENTMCNC: 34.6 G/DL (ref 31.5–36.5)
MCV RBC AUTO: 88 FL (ref 78–100)
PLATELET # BLD AUTO: 156 10E9/L (ref 150–450)
POTASSIUM SERPL-SCNC: 4 MMOL/L (ref 3.4–5.3)
PROT SERPL-MCNC: 7.3 G/DL (ref 6.8–8.8)
RBC # BLD AUTO: 4.97 10E12/L (ref 4.4–5.9)
SODIUM SERPL-SCNC: 139 MMOL/L (ref 133–144)
WBC # BLD AUTO: 5.5 10E9/L (ref 4–11)

## 2020-06-03 PROCEDURE — 80076 HEPATIC FUNCTION PANEL: CPT | Performed by: INTERNAL MEDICINE

## 2020-06-03 PROCEDURE — 85610 PROTHROMBIN TIME: CPT | Performed by: INTERNAL MEDICINE

## 2020-06-03 PROCEDURE — 36415 COLL VENOUS BLD VENIPUNCTURE: CPT | Performed by: INTERNAL MEDICINE

## 2020-06-03 PROCEDURE — 80048 BASIC METABOLIC PNL TOTAL CA: CPT | Performed by: INTERNAL MEDICINE

## 2020-06-03 PROCEDURE — 85027 COMPLETE CBC AUTOMATED: CPT | Performed by: INTERNAL MEDICINE

## 2020-06-03 PROCEDURE — 87517 HEPATITIS B DNA QUANT: CPT | Performed by: INTERNAL MEDICINE

## 2020-06-05 ENCOUNTER — TELEPHONE (OUTPATIENT)
Dept: TRANSPLANT | Facility: CLINIC | Age: 30
End: 2020-06-05

## 2020-06-05 NOTE — TELEPHONE ENCOUNTER
Confirmed video visit date and time with patient.  Also, confirmed patient's Phone number for Text, and sent video visit Instructions ([email or web address]) to patient via [employer].

## 2020-06-05 NOTE — TELEPHONE ENCOUNTER
Used  and Confirmed video visit date and time with patient.  Also, confirmed patient's Phone number , and sent video visit Instructions ([email or web address]) to patient via [employer].

## 2020-06-06 LAB
HBV DNA SERPL NAA+PROBE-ACNC: <20 [IU]/ML
HBV DNA SERPL NAA+PROBE-LOG IU: <1.3 {LOG_IU}/ML

## 2020-06-09 ENCOUNTER — VIRTUAL VISIT (OUTPATIENT)
Dept: GASTROENTEROLOGY | Facility: CLINIC | Age: 30
End: 2020-06-09
Attending: INTERNAL MEDICINE

## 2020-06-09 DIAGNOSIS — B18.1 CHRONIC VIRAL HEPATITIS B WITHOUT DELTA AGENT AND WITHOUT COMA (H): Primary | ICD-10-CM

## 2020-06-09 RX ORDER — TENOFOVIR DISOPROXIL FUMARATE 300 MG/1
300 TABLET, FILM COATED ORAL DAILY
Qty: 90 TABLET | Refills: 3 | Status: SHIPPED | OUTPATIENT
Start: 2020-06-09 | End: 2021-04-02

## 2020-06-09 ASSESSMENT — PAIN SCALES - GENERAL: PAINLEVEL: NO PAIN (0)

## 2020-06-09 NOTE — PROGRESS NOTES
"1st attempt to reach patient, no answer, lft voicemail, will try back in a bit 10:35am    Saqib Holley is a 30 year old male who is being evaluated via a billable video visit.      The patient has been notified of following:     \"This video visit will be conducted via a call between you and your physician/provider. We have found that certain health care needs can be provided without the need for an in-person physical exam.  This service lets us provide the care you need with a video conversation.  If a prescription is necessary we can send it directly to your pharmacy.  If lab work is needed we can place an order for that and you can then stop by our lab to have the test done at a later time.    Video visits are billed at different rates depending on your insurance coverage.  Please reach out to your insurance provider with any questions.    If during the course of the call the physician/provider feels a video visit is not appropriate, you will not be charged for this service.\"    Patient has given verbal consent for Video visit? Yes    How would you like to obtain your AVS? [place]    Patient would like the video invitation sent by: Text to cell phone: [phone]    Will anyone else be joining your video visit? No        Saqib Holley is a 30 year old male with PMH of hepatitis B. Last seen in clinic on 6/24/2019 with no major complaints at that visit. Today, patient states that his is doing well. Denies any fevers, chills, nausea, vomiting, abdominal pain, or ascites. Denies any hematochezia, hemoptysis, or melena. Patient states he takes his Tenofovir as prescribed. 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 Only compliant by patient is some numb pain/dull ache in his liver, around a 1/10 on the pain scale, occurs once every 3 months with the last occurrence around 3 weeks ago. Pain typically " lasts approximately one hour. He does not usually require any pain medications for improvement. Discussed with patient that pain is most likely secondary to his hepatitis B, no imaging with ultrasound required.     O: last labs on 6/3/2020,   Recent Results (from the past 168 hour(s))   INR    Collection Time: 06/03/20 11:29 AM   Result Value Ref Range    INR 1.02 0.86 - 1.14   Hepatic panel    Collection Time: 06/03/20 11:29 AM   Result Value Ref Range    Bilirubin Direct 0.2 0.0 - 0.2 mg/dL    Bilirubin Total 1.5 (H) 0.2 - 1.3 mg/dL    Albumin 4.0 3.4 - 5.0 g/dL    Protein Total 7.3 6.8 - 8.8 g/dL    Alkaline Phosphatase 100 40 - 150 U/L    ALT 56 0 - 70 U/L    AST 35 0 - 45 U/L   Hep B Virus DNA Quant Real Time PCR    Collection Time: 06/03/20 11:29 AM   Result Value Ref Range    HEP B Virus DNA Quant <20 (A) HBVND^HBV DNA Not Detected [IU]/mL    HEP B Virus DNA Quant Log <1.3 <1.3 [Log_IU]/mL   CBC with platelets    Collection Time: 06/03/20 11:29 AM   Result Value Ref Range    WBC 5.5 4.0 - 11.0 10e9/L    RBC Count 4.97 4.4 - 5.9 10e12/L    Hemoglobin 15.1 13.3 - 17.7 g/dL    Hematocrit 43.6 40.0 - 53.0 %    MCV 88 78 - 100 fl    MCH 30.4 26.5 - 33.0 pg    MCHC 34.6 31.5 - 36.5 g/dL    RDW 11.9 10.0 - 15.0 %    Platelet Count 156 150 - 450 10e9/L   Basic metabolic panel    Collection Time: 06/03/20 11:29 AM   Result Value Ref Range    Sodium 139 133 - 144 mmol/L    Potassium 4.0 3.4 - 5.3 mmol/L    Chloride 104 94 - 109 mmol/L    Carbon Dioxide 29 20 - 32 mmol/L    Anion Gap 6 3 - 14 mmol/L    Glucose 99 70 - 99 mg/dL    Urea Nitrogen 18 7 - 30 mg/dL    Creatinine 0.84 0.66 - 1.25 mg/dL    GFR Estimate >90 >60 mL/min/[1.73_m2]    GFR Estimate If Black >90 >60 mL/min/[1.73_m2]    Calcium 9.1 8.5 - 10.1 mg/dL          My impression is that Mr. Holley is doing well.  His liver tests are normal and his virus is well suppressed.  He will not need HCC screening until age 41.    My plan be to see the patient back in  the clinic again in 1 year.    Video-Visit Details    Type of service:  Video Visit    Video Start Time: 6/9/2020 11:00 AM   Video End Time: 6/9/2020 11:20 AM    Originating Location (pt. Location): Home    Distant Location (provider location):  [place] [facility]     Platform used for Video Visit: [facility]    Patient seen and discussed with Dr. Corona.     Eli Delvalle MD    Internal Medicine, PGY-1     The patient was seen via a video visit.  The above assessment and plan was developed jointly with the resident.       Tripp Corona MD      Professor of Medicine  [employer]      Executive Medical Director, Solid Organ Transplant Program  [facility]

## 2020-06-09 NOTE — LETTER
"    6/9/2020         RE: Saqib Holley  [address]  [facility] [address]        Dear Colleague,    Thank you for referring your patient, Saqib Holley, to the [facility] HEPAT[facility]. Please see a copy of my visit note below.    1st attempt to reach patient, no answer, lft voicemail, will try back in a bit 10:35am    Saqib Holley is a 30 year old male who is being evaluated via a billable video visit.      The patient has been notified of following:     \"This video visit will be conducted via a call between you and your physician/provider. We have found that certain health care needs can be provided without the need for an in-person physical exam.  This service lets us provide the care you need with a video conversation.  If a prescription is necessary we can send it directly to your pharmacy.  If lab work is needed we can place an order for that and you can then stop by our lab to have the test done at a later time.    Video visits are billed at different rates depending on your insurance coverage.  Please reach out to your insurance provider with any questions.    If during the course of the call the physician/provider feels a video visit is not appropriate, you will not be charged for this service.\"    Patient has given verbal consent for Video visit? Yes    How would you like to obtain your AVS? [facility]    Patient would like the video invitation sent by: Text to cell phone: [phone]    Will anyone else be joining your video visit? No        Saqib Holley is a 30 year old male with PMH of hepatitis B. Last seen in clinic on 6/24/2019 with no major complaints at that visit. Today, patient states that his is doing well. Denies any fevers, chills, nausea, vomiting, abdominal pain, or ascites. Denies any hematochezia, hemoptysis, or melena. Patient states he takes his Tenofovir as prescribed. 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 Only compliant by patient is some numb pain/dull ache in his liver, around a 1/10 on the pain scale, occurs once every 3 months with the last occurrence around 3 weeks ago. Pain typically lasts approximately one hour. He does not usually require any pain medications for improvement. Discussed with patient that pain is most likely secondary to his hepatitis B, no imaging with ultrasound required.     O: last labs on 6/3/2020,   Recent Results (from the past 168 hour(s))   INR    Collection Time: 06/03/20 11:29 AM   Result Value Ref Range    INR 1.02 0.86 - 1.14   Hepatic panel    Collection Time: 06/03/20 11:29 AM   Result Value Ref Range    Bilirubin Direct 0.2 0.0 - 0.2 mg/dL    Bilirubin Total 1.5 (H) 0.2 - 1.3 mg/dL    Albumin 4.0 3.4 - 5.0 g/dL    Protein Total 7.3 6.8 - 8.8 g/dL    Alkaline Phosphatase 100 40 - 150 U/L    ALT 56 0 - 70 U/L    AST 35 0 - 45 U/L   Hep B Virus DNA Quant Real Time PCR    Collection Time: 06/03/20 11:29 AM   Result Value Ref Range    HEP B Virus DNA Quant <20 (A) HBVND^HBV DNA Not Detected [IU]/mL    HEP B Virus DNA Quant Log <1.3 <1.3 [Log_IU]/mL   CBC with platelets    Collection Time: 06/03/20 11:29 AM   Result Value Ref Range    WBC 5.5 4.0 - 11.0 10e9/L    RBC Count 4.97 4.4 - 5.9 10e12/L    Hemoglobin 15.1 13.3 - 17.7 g/dL    Hematocrit 43.6 40.0 - 53.0 %    MCV 88 78 - 100 fl    MCH 30.4 26.5 - 33.0 pg    MCHC 34.6 31.5 - 36.5 g/dL    RDW 11.9 10.0 - 15.0 %    Platelet Count 156 150 - 450 10e9/L   Basic metabolic panel    Collection Time: 06/03/20 11:29 AM   Result Value Ref Range    Sodium 139 133 - 144 mmol/L    Potassium 4.0 3.4 - 5.3 mmol/L    Chloride 104 94 - 109 mmol/L    Carbon Dioxide 29 20 - 32 mmol/L    Anion Gap 6 3 - 14 mmol/L    Glucose 99 70 - 99 mg/dL    Urea Nitrogen 18 7 - 30 mg/dL    Creatinine 0.84 0.66 - 1.25 mg/dL    GFR Estimate >90 >60 mL/min/[1.73_m2]    GFR Estimate If Black >90 >60  mL/min/[1.73_m2]    Calcium 9.1 8.5 - 10.1 mg/dL          My impression is that Mr. Holley is doing well.  His liver tests are normal and his virus is well suppressed.  He will not need HCC screening until age 41.    My plan be to see the patient back in the clinic again in 1 year.    Video-Visit Details    Type of service:  Video Visit    Video Start Time: 6/9/2020 11:00 AM   Video End Time: 6/9/2020 11:20 AM    Originating Location (pt. Location): Home    Distant Location (provider location):  [facility]PAT[facility]     Platform used for Video Visit: [facility]    Patient seen and discussed with Dr. Corona.     Eli Delvalle MD    Internal Medicine, PGY-1     The patient was seen via a video visit.  The above assessment and plan was developed jointly with the resident.       Tripp Corona MD      Professor of Medicine  [employer]      Executive Medical Director, Solid Organ Transplant Program  [facility]      Again, thank you for allowing me to participate in the care of your patient.        Sincerely,        Tripp Corona MD

## 2020-06-25 ENCOUNTER — TELEPHONE (OUTPATIENT)
Dept: GASTROENTEROLOGY | Facility: CLINIC | Age: 30
End: 2020-06-25

## 2020-12-27 ENCOUNTER — HEALTH MAINTENANCE LETTER (OUTPATIENT)
Age: 30
End: 2020-12-27

## 2021-03-29 DIAGNOSIS — B18.1 CHRONIC VIRAL HEPATITIS B WITHOUT DELTA AGENT AND WITHOUT COMA (H): ICD-10-CM

## 2021-03-29 LAB
AFP SERPL-MCNC: <1.5 UG/L (ref 0–8)
ALBUMIN SERPL-MCNC: 3.9 G/DL (ref 3.4–5)
ALP SERPL-CCNC: 99 U/L (ref 40–150)
ALT SERPL W P-5'-P-CCNC: 52 U/L (ref 0–70)
ANION GAP SERPL CALCULATED.3IONS-SCNC: 4 MMOL/L (ref 3–14)
AST SERPL W P-5'-P-CCNC: 33 U/L (ref 0–45)
BILIRUB DIRECT SERPL-MCNC: 0.2 MG/DL (ref 0–0.2)
BILIRUB SERPL-MCNC: 0.9 MG/DL (ref 0.2–1.3)
BUN SERPL-MCNC: 20 MG/DL (ref 7–30)
CALCIUM SERPL-MCNC: 9 MG/DL (ref 8.5–10.1)
CHLORIDE SERPL-SCNC: 106 MMOL/L (ref 94–109)
CO2 SERPL-SCNC: 27 MMOL/L (ref 20–32)
CREAT SERPL-MCNC: 0.9 MG/DL (ref 0.66–1.25)
ERYTHROCYTE [DISTWIDTH] IN BLOOD BY AUTOMATED COUNT: 11.7 % (ref 10–15)
GFR SERPL CREATININE-BSD FRML MDRD: >90 ML/MIN/{1.73_M2}
GLUCOSE SERPL-MCNC: 105 MG/DL (ref 70–99)
HBV DNA SERPL NAA+PROBE-ACNC: <20 [IU]/ML
HBV DNA SERPL NAA+PROBE-LOG IU: <1.3 {LOG_IU}/ML
HCT VFR BLD AUTO: 43.8 % (ref 40–53)
HGB BLD-MCNC: 15.4 G/DL (ref 13.3–17.7)
INR PPP: 1.06 (ref 0.86–1.14)
MCH RBC QN AUTO: 30.4 PG (ref 26.5–33)
MCHC RBC AUTO-ENTMCNC: 35.2 G/DL (ref 31.5–36.5)
MCV RBC AUTO: 86 FL (ref 78–100)
PLATELET # BLD AUTO: 181 10E9/L (ref 150–450)
POTASSIUM SERPL-SCNC: 4.2 MMOL/L (ref 3.4–5.3)
PROT SERPL-MCNC: 7.6 G/DL (ref 6.8–8.8)
RBC # BLD AUTO: 5.07 10E12/L (ref 4.4–5.9)
SODIUM SERPL-SCNC: 137 MMOL/L (ref 133–144)
WBC # BLD AUTO: 6.3 10E9/L (ref 4–11)

## 2021-03-29 PROCEDURE — 85610 PROTHROMBIN TIME: CPT | Performed by: PATHOLOGY

## 2021-03-29 PROCEDURE — 36415 COLL VENOUS BLD VENIPUNCTURE: CPT | Performed by: PATHOLOGY

## 2021-03-29 PROCEDURE — 82105 ALPHA-FETOPROTEIN SERUM: CPT | Mod: 90 | Performed by: PATHOLOGY

## 2021-03-29 PROCEDURE — 80048 BASIC METABOLIC PNL TOTAL CA: CPT | Performed by: PATHOLOGY

## 2021-03-29 PROCEDURE — 87517 HEPATITIS B DNA QUANT: CPT | Mod: 90 | Performed by: PATHOLOGY

## 2021-03-29 PROCEDURE — 80076 HEPATIC FUNCTION PANEL: CPT | Performed by: PATHOLOGY

## 2021-03-29 PROCEDURE — 99000 SPECIMEN HANDLING OFFICE-LAB: CPT | Performed by: PATHOLOGY

## 2021-03-29 PROCEDURE — 85027 COMPLETE CBC AUTOMATED: CPT | Performed by: PATHOLOGY

## 2021-04-02 ENCOUNTER — VIRTUAL VISIT (OUTPATIENT)
Dept: GASTROENTEROLOGY | Facility: CLINIC | Age: 31
End: 2021-04-02
Attending: INTERNAL MEDICINE

## 2021-04-02 DIAGNOSIS — B18.1 CHRONIC VIRAL HEPATITIS B WITHOUT DELTA AGENT AND WITHOUT COMA (H): Primary | ICD-10-CM

## 2021-04-02 PROCEDURE — 99213 OFFICE O/P EST LOW 20 MIN: CPT | Mod: 95 | Performed by: INTERNAL MEDICINE

## 2021-04-02 RX ORDER — TENOFOVIR DISOPROXIL FUMARATE 300 MG/1
300 TABLET, FILM COATED ORAL DAILY
Qty: 90 TABLET | Refills: 3 | Status: SHIPPED | OUTPATIENT
Start: 2021-04-02 | End: 2022-04-01

## 2021-04-02 NOTE — PROGRESS NOTES
"Saqib is a 31 year old who is being evaluated via a billable video visit.      How would you like to obtain your AVS? MyChart  If the video visit is dropped, the invitation should be resent by: Text to cell phone: [phone]  Will anyone else be joining your video visit? No  {If patient encounters technical issues they should call [phone] :143217}    Video Start Time: {video visit start/end time for provider to select:255999}    {PROVIDER CHARTING PREFERENCE:700109}    Subjective   Saqib is a 31 year old who presents for the following health issues {ACCOMPANIED BY STATEMENT (Optional):142545}    HPI     ***    Review of Systems   {ROS COMP (Optional):989087}      Objective           Vitals:  No vitals were obtained today due to virtual visit.    Physical Exam   {video visit exam brief selected:230248::\"GENERAL: Healthy, alert and no distress\",\"EYES: Eyes grossly normal to inspection.  No discharge or erythema, or obvious scleral/conjunctival abnormalities.\",\"RESP: No audible wheeze, cough, or visible cyanosis.  No visible retractions or increased work of breathing.  \",\"SKIN: Visible skin clear. No significant rash, abnormal pigmentation or lesions.\",\"NEURO: Cranial nerves grossly intact.  Mentation and speech appropriate for age.\",\"PSYCH: Mentation appears normal, affect normal/bright, judgement and insight intact, normal speech and appearance well-groomed.\"}    {Diagnostic Test Results (Optional):056188}    {AMBULATORY ATTESTATION (Optional):448576}        Video-Visit Details    Type of service:  Video Visit    Video End Time:{video visit start/end time for provider to select:575656}    Originating Location (pt. Location): {video visit patient location:155743::\"Home\"}    Distant Location (provider location):  [facility] [place]     Platform used for Video Visit: {Virtual Visit Platforms:171525::\"[employer]LINE [employer]Well\"}    "

## 2021-04-02 NOTE — LETTER
Date:April 5, 2021      Patient was self referred, no letter generated. Do not send.        [facility] Health Information

## 2021-04-02 NOTE — PROGRESS NOTES
Saqib is a 31 year old who is being evaluated via a billable video visit.      How would you like to obtain your AVS? By mail  If the video visit is dropped, the invitation should be resent by: NA  Will anyone else be joining your video visit? No    Video Start Time: 9:00 AM    Subjective   Saqib is a 31 year old who presents for the following health issues: chronic hepatitis B    HPI:  I had the pleasure of seeing Saqib Holley for followup in the Liver Clinic at the [facility] on April 2, 2021.  Mr. Holley returns for followup of chronic hepatitis B.  He is in the immune active phase and is currently on tenofovir.      He feels well at this visit.  He denies any abdominal pain, itching or skin rash or fatigue.  He denies any increased abdominal girth or lower extremity edema.      He denies any fevers or chills, cough or shortness of breath.  He denies any nausea, vomiting, diarrhea or constipation.  His appetite has been good, and his weight has been stable.  The major new events since he was last seen is that he and his wife are pregnant and she is expecting to deliver at the end of this month.    Current Outpatient Medications   Medication     entecavir (BARACLUDE) 0.5 MG tablet     Ginkgo Biloba 40 MG TABS     magnesium 250 MG tablet     Multiple Vitamins-Minerals (MULTIVITAMIN ADULTS PO)     tenofovir (VIREAD) 300 MG tablet     vitamin D3 (CHOLECALCIFEROL) 2000 units tablet     No current facility-administered medications for this visit.      Objective      Vitals:  No vitals were obtained today due to virtual visit.    Physical Exam: GENERAL: Healthy, alert and no distress. EYES: Eyes grossly normal to inspection.  No discharge or erythema, or obvious scleral/conjunctival abnormalities. RESP: No audible wheeze, cough, or visible cyanosis.  No visible retractions or increased work of breathing. SKIN: Visible skin clear. No significant rash, abnormal pigmentation or lesions.  NEURO: Cranial nerves grossly intact.  Mentation and speech appropriate for age. PSYCH: Mentation appears normal, affect normal/bright, judgement and insight intact, normal speech and appearance well-groomed.    Recent Results (from the past 168 hour(s))   INR [VBS5480]    Collection Time: 03/29/21 11:13 AM   Result Value Ref Range    INR 1.06 0.86 - 1.14   CBC with platelets [[id number]]    Collection Time: 03/29/21 11:13 AM   Result Value Ref Range    WBC 6.3 4.0 - 11.0 10e9/L    RBC Count 5.07 4.4 - 5.9 10e12/L    Hemoglobin 15.4 13.3 - 17.7 g/dL    Hematocrit 43.8 40.0 - 53.0 %    MCV 86 78 - 100 fl    MCH 30.4 26.5 - 33.0 pg    MCHC 35.2 31.5 - 36.5 g/dL    RDW 11.7 10.0 - 15.0 %    Platelet Count 181 150 - 450 10e9/L   AFP tumor marker [[id number]]    Collection Time: 03/29/21 11:13 AM   Result Value Ref Range    Alpha Fetoprotein <1.5 0 - 8 ug/L   Hep B Virus DNA Quant Real Time PCR [[id number]]    Collection Time: 03/29/21 11:13 AM   Result Value Ref Range    HEP B Virus DNA Quant <20 (A) HBVND^HBV DNA Not Detected [IU]/mL    HEP B Virus DNA Quant Log <1.3 <1.3 [Log_IU]/mL   Basic metabolic panel [LAB15]    Collection Time: 03/29/21 11:13 AM   Result Value Ref Range    Sodium 137 133 - 144 mmol/L    Potassium 4.2 3.4 - 5.3 mmol/L    Chloride 106 94 - 109 mmol/L    Carbon Dioxide 27 20 - 32 mmol/L    Anion Gap 4 3 - 14 mmol/L    Glucose 105 (H) 70 - 99 mg/dL    Urea Nitrogen 20 7 - 30 mg/dL    Creatinine 0.90 0.66 - 1.25 mg/dL    GFR Estimate >90 >60 mL/min/[1.73_m2]    GFR Estimate If Black >90 >60 mL/min/[1.73_m2]    Calcium 9.0 8.5 - 10.1 mg/dL   Hepatic panel [LAB20]    Collection Time: 03/29/21 11:13 AM   Result Value Ref Range    Bilirubin Direct 0.2 0.0 - 0.2 mg/dL    Bilirubin Total 0.9 0.2 - 1.3 mg/dL    Albumin 3.9 3.4 - 5.0 g/dL    Protein Total 7.6 6.8 - 8.8 g/dL    Alkaline Phosphatase 99 40 - 150 U/L    ALT 52 0 - 70 U/L    AST 33 0 - 45 U/L      My impression is that Mr. Holley has chronic hepatitis B.   His transaminases remain normal, and his HBV DNA level < 20.    I will continue him on his current dose of tenofovir.    We did discuss COVID-19.  I did point out that simply having hepatitis B does not put him at increased risk for more severe disease.  Nonetheless I did encourage him to get the vaccine once it is available.    Otherwise my plan to be to see him back in the clinic in 1 year.     Thank you very much for allowing me to participate in the care of this patient.  If you have any questions regarding my recommendations, please do not hesitate to contact me.         Tripp Corona MD      Professor of Medicine  [employer]      Executive Medical Director, Solid Organ Transplant Program  [facility]    Video-Visit Details    Type of service:  Video Visit    Video End Time: 9:19 AM    Originating Location (pt. Location): Home    Distant Location (provider location):  [facility] [place]     Platform used for Video Visit: [email or web address]

## 2021-04-02 NOTE — LETTER
4/2/2021         RE: Saqib Holley  [address]  [facility] [address]        Dear Colleague,    Thank you for referring your patient, Saqib Holley, to the [facility] HEPATOLOGY CLINIC [place]. Please see a copy of my visit note below.    Saqib is a 31 year old who is being evaluated via a billable video visit.      How would you like to obtain your AVS? By mail  If the video visit is dropped, the invitation should be resent by: NA  Will anyone else be joining your video visit? No    Video Start Time: 9:00 AM    Subjective   Saqib is a 31 year old who presents for the following health issues: chronic hepatitis B    HPI:  I had the pleasure of seeing Saqib Holley for followup in the Liver Clinic at the [facility] on April 2, 2021.  Mr. Holley returns for followup of chronic hepatitis B.  He is in the immune active phase and is currently on tenofovir.      He feels well at this visit.  He denies any abdominal pain, itching or skin rash or fatigue.  He denies any increased abdominal girth or lower extremity edema.      He denies any fevers or chills, cough or shortness of breath.  He denies any nausea, vomiting, diarrhea or constipation.  His appetite has been good, and his weight has been stable.  The major new events since he was last seen is that he and his wife are pregnant and she is expecting to deliver at the end of this month.    Current Outpatient Medications   Medication     entecavir (BARACLUDE) 0.5 MG tablet     Ginkgo Biloba 40 MG TABS     magnesium 250 MG tablet     Multiple Vitamins-Minerals (MULTIVITAMIN ADULTS PO)     tenofovir (VIREAD) 300 MG tablet     vitamin D3 (CHOLECALCIFEROL) 2000 units tablet     No current facility-administered medications for this visit.      Objective      Vitals:  No vitals were obtained today due to virtual visit.    Physical Exam: GENERAL: Healthy, alert and no distress. EYES: Eyes grossly normal to  inspection.  No discharge or erythema, or obvious scleral/conjunctival abnormalities. RESP: No audible wheeze, cough, or visible cyanosis.  No visible retractions or increased work of breathing. SKIN: Visible skin clear. No significant rash, abnormal pigmentation or lesions. NEURO: Cranial nerves grossly intact.  Mentation and speech appropriate for age. PSYCH: Mentation appears normal, affect normal/bright, judgement and insight intact, normal speech and appearance well-groomed.    Recent Results (from the past 168 hour(s))   INR [[id number]]    Collection Time: 03/29/21 11:13 AM   Result Value Ref Range    INR 1.06 0.86 - 1.14   CBC with platelets [[id number]]    Collection Time: 03/29/21 11:13 AM   Result Value Ref Range    WBC 6.3 4.0 - 11.0 10e9/L    RBC Count 5.07 4.4 - 5.9 10e12/L    Hemoglobin 15.4 13.3 - 17.7 g/dL    Hematocrit 43.8 40.0 - 53.0 %    MCV 86 78 - 100 fl    MCH 30.4 26.5 - 33.0 pg    MCHC 35.2 31.5 - 36.5 g/dL    RDW 11.7 10.0 - 15.0 %    Platelet Count 181 150 - 450 10e9/L   AFP tumor marker [[id number]]    Collection Time: 03/29/21 11:13 AM   Result Value Ref Range    Alpha Fetoprotein <1.5 0 - 8 ug/L   Hep B Virus DNA Quant Real Time PCR [[id number]]    Collection Time: 03/29/21 11:13 AM   Result Value Ref Range    HEP B Virus DNA Quant <20 (A) HBVND^HBV DNA Not Detected [IU]/mL    HEP B Virus DNA Quant Log <1.3 <1.3 [Log_IU]/mL   Basic metabolic panel [LAB15]    Collection Time: 03/29/21 11:13 AM   Result Value Ref Range    Sodium 137 133 - 144 mmol/L    Potassium 4.2 3.4 - 5.3 mmol/L    Chloride 106 94 - 109 mmol/L    Carbon Dioxide 27 20 - 32 mmol/L    Anion Gap 4 3 - 14 mmol/L    Glucose 105 (H) 70 - 99 mg/dL    Urea Nitrogen 20 7 - 30 mg/dL    Creatinine 0.90 0.66 - 1.25 mg/dL    GFR Estimate >90 >60 mL/min/[1.73_m2]    GFR Estimate If Black >90 >60 mL/min/[1.73_m2]    Calcium 9.0 8.5 - 10.1 mg/dL   Hepatic panel [LAB20]    Collection Time: 03/29/21 11:13 AM   Result Value Ref Range    Bilirubin  Direct 0.2 0.0 - 0.2 mg/dL    Bilirubin Total 0.9 0.2 - 1.3 mg/dL    Albumin 3.9 3.4 - 5.0 g/dL    Protein Total 7.6 6.8 - 8.8 g/dL    Alkaline Phosphatase 99 40 - 150 U/L    ALT 52 0 - 70 U/L    AST 33 0 - 45 U/L      My impression is that Mr. Holley has chronic hepatitis B.  His transaminases remain normal, and his HBV DNA level < 20.    I will continue him on his current dose of tenofovir.    We did discuss COVID-19.  I did point out that simply having hepatitis B does not put him at increased risk for more severe disease.  Nonetheless I did encourage him to get the vaccine once it is available.    Otherwise my plan to be to see him back in the clinic in 1 year.     Thank you very much for allowing me to participate in the care of this patient.  If you have any questions regarding my recommendations, please do not hesitate to contact me.         Tripp Corona MD      Professor of Medicine  [employer]      Executive Medical Director, Solid Organ Transplant Program  [facility]    Video-Visit Details    Type of service:  Video Visit    Video End Time: 9:19 AM    Originating Location (pt. Location): Home    Distant Location (provider location):  [facility] HEPATOLOGY CLINIC [place]     Platform used for Video Visit: DevanWell        Again, thank you for allowing me to participate in the care of your patient.        Sincerely,        Tripp Corona MD

## 2021-04-24 ENCOUNTER — HEALTH MAINTENANCE LETTER (OUTPATIENT)
Age: 31
End: 2021-04-24

## 2021-10-03 ENCOUNTER — HEALTH MAINTENANCE LETTER (OUTPATIENT)
Age: 31
End: 2021-10-03

## 2022-03-28 ENCOUNTER — LAB (OUTPATIENT)
Dept: LAB | Facility: CLINIC | Age: 32
End: 2022-03-28

## 2022-03-28 DIAGNOSIS — B18.1 CHRONIC VIRAL HEPATITIS B WITHOUT DELTA AGENT AND WITHOUT COMA (H): ICD-10-CM

## 2022-03-28 LAB
AFP SERPL-MCNC: 1.9 UG/L (ref 0–8)
ALBUMIN SERPL-MCNC: 4.1 G/DL (ref 3.4–5)
ALP SERPL-CCNC: 81 U/L (ref 40–150)
ALT SERPL W P-5'-P-CCNC: 56 U/L (ref 0–70)
ANION GAP SERPL CALCULATED.3IONS-SCNC: 9 MMOL/L (ref 3–14)
AST SERPL W P-5'-P-CCNC: 34 U/L (ref 0–45)
BILIRUB DIRECT SERPL-MCNC: 0.2 MG/DL (ref 0–0.2)
BILIRUB SERPL-MCNC: 1.2 MG/DL (ref 0.2–1.3)
BUN SERPL-MCNC: 18 MG/DL (ref 7–30)
CALCIUM SERPL-MCNC: 9.3 MG/DL (ref 8.5–10.1)
CHLORIDE BLD-SCNC: 106 MMOL/L (ref 94–109)
CO2 SERPL-SCNC: 26 MMOL/L (ref 20–32)
CREAT SERPL-MCNC: 0.98 MG/DL (ref 0.66–1.25)
ERYTHROCYTE [DISTWIDTH] IN BLOOD BY AUTOMATED COUNT: 12.3 % (ref 10–15)
GFR SERPL CREATININE-BSD FRML MDRD: >90 ML/MIN/1.73M2
GLUCOSE BLD-MCNC: 101 MG/DL (ref 70–99)
HCT VFR BLD AUTO: 43.7 % (ref 40–53)
HGB BLD-MCNC: 15.1 G/DL (ref 13.3–17.7)
INR PPP: 1.09 (ref 0.85–1.15)
MCH RBC QN AUTO: 30.6 PG (ref 26.5–33)
MCHC RBC AUTO-ENTMCNC: 34.6 G/DL (ref 31.5–36.5)
MCV RBC AUTO: 89 FL (ref 78–100)
PLATELET # BLD AUTO: 195 10E3/UL (ref 150–450)
POTASSIUM BLD-SCNC: 4.1 MMOL/L (ref 3.4–5.3)
PROT SERPL-MCNC: 7.3 G/DL (ref 6.8–8.8)
RBC # BLD AUTO: 4.94 10E6/UL (ref 4.4–5.9)
SODIUM SERPL-SCNC: 141 MMOL/L (ref 133–144)
WBC # BLD AUTO: 7 10E3/UL (ref 4–11)

## 2022-03-28 PROCEDURE — 82248 BILIRUBIN DIRECT: CPT

## 2022-03-28 PROCEDURE — 85610 PROTHROMBIN TIME: CPT

## 2022-03-28 PROCEDURE — 80053 COMPREHEN METABOLIC PANEL: CPT

## 2022-03-28 PROCEDURE — 36415 COLL VENOUS BLD VENIPUNCTURE: CPT

## 2022-03-28 PROCEDURE — 85027 COMPLETE CBC AUTOMATED: CPT

## 2022-03-28 PROCEDURE — 87517 HEPATITIS B DNA QUANT: CPT

## 2022-03-28 PROCEDURE — 82105 ALPHA-FETOPROTEIN SERUM: CPT

## 2022-03-29 LAB
HBV DNA SERPL NAA+PROBE-ACNC: <20 IU/ML
HBV DNA SERPL NAA+PROBE-LOG IU: <1.3 {LOG_IU}/ML

## 2022-04-01 ENCOUNTER — VIRTUAL VISIT (OUTPATIENT)
Dept: GASTROENTEROLOGY | Facility: CLINIC | Age: 32
End: 2022-04-01
Attending: INTERNAL MEDICINE

## 2022-04-01 DIAGNOSIS — B18.1 CHRONIC VIRAL HEPATITIS B WITHOUT DELTA AGENT AND WITHOUT COMA (H): Primary | ICD-10-CM

## 2022-04-01 PROCEDURE — 99214 OFFICE O/P EST MOD 30 MIN: CPT | Mod: 95 | Performed by: INTERNAL MEDICINE

## 2022-04-01 PROCEDURE — G0463 HOSPITAL OUTPT CLINIC VISIT: HCPCS | Mod: PN,RTG | Performed by: INTERNAL MEDICINE

## 2022-04-01 RX ORDER — TENOFOVIR DISOPROXIL FUMARATE 300 MG/1
300 TABLET, FILM COATED ORAL DAILY
Qty: 90 TABLET | Refills: 3 | Status: SHIPPED | OUTPATIENT
Start: 2022-04-01 | End: 2023-06-01

## 2022-04-01 RX ORDER — LACTOBACILLUS RHAMNOSUS GG 10B CELL
1 CAPSULE ORAL 2 TIMES DAILY
COMMUNITY
End: 2024-06-06

## 2022-04-01 NOTE — LETTER
4/1/2022     RE: Saqib Holley  [address]  [facility] [address]    Dear Colleague,    Thank you for referring your patient, Saqib Holley, to the [facility] HEPATOLOGY CLINIC [place]. Please see a copy of my visit note below.    Saqib is a 32 year old who is being evaluated via a billable video visit.      How would you like to obtain your AVS? MyChart  If the video visit is dropped, the invitation should be resent by: Text to cell phone: [phone]  Will anyone else be joining your video visit? No    Video Start Time: 10:50 AM    Subjective   Saqib is a 32 year old who presents for the following health issues: hepatitis B    HPI: I had the pleasure of seeing Saqib Holley for followup in the Liver Clinic at the [facility] on April 1, 2022.  Mr. Holley returns for followup of chronic hepatitis B.  He is in the immune active phase and is currently on tenofovir.      He feels well at this visit.  He denies any abdominal pain, itching or skin rash or fatigue.  He denies any increased abdominal girth or lower extremity edema.       He denies any fevers or chills, cough or shortness of breath.  He denies any nausea, vomiting, diarrhea or constipation.  His appetite has been good, and his weight has been stable.      The major new events since he was last seen is that his wife delivered a healthy baby girl after the last visit.    Current Outpatient Medications   Medication     lactobacillus rhamnosus, GG, (CULTURELL) capsule     magnesium 250 MG tablet     Multiple Vitamins-Minerals (MULTIVITAMIN ADULTS PO)     tenofovir (VIREAD) 300 MG tablet     vitamin D3 (CHOLECALCIFEROL) 2000 units tablet     No current facility-administered medications for this visit.     Objective      Vitals:  No vitals were obtained today due to virtual visit.    Physical Exam: GENERAL: Healthy, alert and no distress. EYES: Eyes grossly normal to inspection.  No discharge or  erythema, or obvious scleral/conjunctival abnormalities. RESP: No audible wheeze, cough, or visible cyanosis.  No visible retractions or increased work of breathing.  SKIN: Visible skin clear. No significant rash, abnormal pigmentation or lesions. NEURO: Cranial nerves grossly intact.  Mentation and speech appropriate for age. PSYCH: Mentation appears normal, affect normal/bright, judgement and insight intact, normal speech and appearance well-groomed.    Recent Results (from the past 168 hour(s))   INR [[id number]]    Collection Time: 03/28/22  1:50 PM   Result Value Ref Range    INR 1.09 0.85 - 1.15   CBC with platelets [[id number]]    Collection Time: 03/28/22  1:50 PM   Result Value Ref Range    WBC Count 7.0 4.0 - 11.0 10e3/uL    RBC Count 4.94 4.40 - 5.90 10e6/uL    Hemoglobin 15.1 13.3 - 17.7 g/dL    Hematocrit 43.7 40.0 - 53.0 %    MCV 89 78 - 100 fL    MCH 30.6 26.5 - 33.0 pg    MCHC 34.6 31.5 - 36.5 g/dL    RDW 12.3 10.0 - 15.0 %    Platelet Count 195 150 - 450 10e3/uL   AFP tumor marker [[id number]]    Collection Time: 03/28/22  1:50 PM   Result Value Ref Range    AFP tumor marker 1.9 0.0 - 8.0 ug/L   Hep B Virus DNA Quant Real Time PCR [[id number]]    Collection Time: 03/28/22  1:50 PM   Result Value Ref Range    Hepatitis B DNA IU/mL <20 (A) Not Detected IU/mL    Hepatitis B log <1.3    Basic metabolic panel [LAB15]    Collection Time: 03/28/22  1:50 PM   Result Value Ref Range    Sodium 141 133 - 144 mmol/L    Potassium 4.1 3.4 - 5.3 mmol/L    Chloride 106 94 - 109 mmol/L    Carbon Dioxide (CO2) 26 20 - 32 mmol/L    Anion Gap 9 3 - 14 mmol/L    Urea Nitrogen 18 7 - 30 mg/dL    Creatinine 0.98 0.66 - 1.25 mg/dL    Calcium 9.3 8.5 - 10.1 mg/dL    Glucose 101 (H) 70 - 99 mg/dL    GFR Estimate >90 >60 mL/min/1.73m2   Hepatic panel [LAB20]    Collection Time: 03/28/22  1:50 PM   Result Value Ref Range    Bilirubin Total 1.2 0.2 - 1.3 mg/dL    Bilirubin Direct 0.2 0.0 - 0.2 mg/dL    Protein Total 7.3 6.8 - 8.8 g/dL     Albumin 4.1 3.4 - 5.0 g/dL    Alkaline Phosphatase 81 40 - 150 U/L    AST 34 0 - 45 U/L    ALT 56 0 - 70 U/L      My impression is that Mr. Holley has chronic hepatitis B.  His transaminases remain normal, and his HBV DNA level < 20.    I will continue him on his current dose of tenofovir.     We did discuss COVID-19.  I did point out that simply having hepatitis B does not put him at increased risk for more severe disease.  Nonetheless I did encourage him to get the vaccine once it is available. Both he and his wife did have COVID-19 last summer     Otherwise my plan to be to see him back in the clinic in 1 year.     Thank you very much for allowing me to participate in the care of this patient.  If you have any questions regarding my recommendations, please do not hesitate to contact me.         Tripp Corona MD      Professor of Medicine  [employer]      Executive Medical Director, Solid Organ Transplant Program  [facility]    Video-Visit Details    Type of service:  Video Visit    Video End Time:11:11 AM    Originating Location (pt. Location): Home    Distant Location (provider location):  [facility] HEPATOLOGY CLINIC [place]     Platform used for Video Visit: DevanWell          Again, thank you for allowing me to participate in the care of your patient.        Sincerely,        Tripp Corona MD

## 2022-04-01 NOTE — PROGRESS NOTES
Saqib is a 32 year old who is being evaluated via a billable video visit.      How would you like to obtain your AVS? MyChart  If the video visit is dropped, the invitation should be resent by: Text to cell phone: [phone]  Will anyone else be joining your video visit? No    Video Start Time: 10:50 AM    Subjective   Saqib is a 32 year old who presents for the following health issues: hepatitis B    HPI: I had the pleasure of seeing Saqib Holley for followup in the Liver Clinic at the [facility] on April 1, 2022.  Mr. Holley returns for followup of chronic hepatitis B.  He is in the immune active phase and is currently on tenofovir.      He feels well at this visit.  He denies any abdominal pain, itching or skin rash or fatigue.  He denies any increased abdominal girth or lower extremity edema.       He denies any fevers or chills, cough or shortness of breath.  He denies any nausea, vomiting, diarrhea or constipation.  His appetite has been good, and his weight has been stable.      The major new events since he was last seen is that his wife delivered a healthy baby girl after the last visit.    Current Outpatient Medications   Medication     lactobacillus rhamnosus, GG, (CULTURELL) capsule     magnesium 250 MG tablet     Multiple Vitamins-Minerals (MULTIVITAMIN ADULTS PO)     tenofovir (VIREAD) 300 MG tablet     vitamin D3 (CHOLECALCIFEROL) 2000 units tablet     No current facility-administered medications for this visit.     Objective      Vitals:  No vitals were obtained today due to virtual visit.    Physical Exam: GENERAL: Healthy, alert and no distress. EYES: Eyes grossly normal to inspection.  No discharge or erythema, or obvious scleral/conjunctival abnormalities. RESP: No audible wheeze, cough, or visible cyanosis.  No visible retractions or increased work of breathing.  SKIN: Visible skin clear. No significant rash, abnormal pigmentation or lesions. NEURO: Cranial  nerves grossly intact.  Mentation and speech appropriate for age. PSYCH: Mentation appears normal, affect normal/bright, judgement and insight intact, normal speech and appearance well-groomed.    Recent Results (from the past 168 hour(s))   INR [[id number]]    Collection Time: 03/28/22  1:50 PM   Result Value Ref Range    INR 1.09 0.85 - 1.15   CBC with platelets [[id number]]    Collection Time: 03/28/22  1:50 PM   Result Value Ref Range    WBC Count 7.0 4.0 - 11.0 10e3/uL    RBC Count 4.94 4.40 - 5.90 10e6/uL    Hemoglobin 15.1 13.3 - 17.7 g/dL    Hematocrit 43.7 40.0 - 53.0 %    MCV 89 78 - 100 fL    MCH 30.6 26.5 - 33.0 pg    MCHC 34.6 31.5 - 36.5 g/dL    RDW 12.3 10.0 - 15.0 %    Platelet Count 195 150 - 450 10e3/uL   AFP tumor marker [[id number]]    Collection Time: 03/28/22  1:50 PM   Result Value Ref Range    AFP tumor marker 1.9 0.0 - 8.0 ug/L   Hep B Virus DNA Quant Real Time PCR [[id number]]    Collection Time: 03/28/22  1:50 PM   Result Value Ref Range    Hepatitis B DNA IU/mL <20 (A) Not Detected IU/mL    Hepatitis B log <1.3    Basic metabolic panel [LAB15]    Collection Time: 03/28/22  1:50 PM   Result Value Ref Range    Sodium 141 133 - 144 mmol/L    Potassium 4.1 3.4 - 5.3 mmol/L    Chloride 106 94 - 109 mmol/L    Carbon Dioxide (CO2) 26 20 - 32 mmol/L    Anion Gap 9 3 - 14 mmol/L    Urea Nitrogen 18 7 - 30 mg/dL    Creatinine 0.98 0.66 - 1.25 mg/dL    Calcium 9.3 8.5 - 10.1 mg/dL    Glucose 101 (H) 70 - 99 mg/dL    GFR Estimate >90 >60 mL/min/1.73m2   Hepatic panel [LAB20]    Collection Time: 03/28/22  1:50 PM   Result Value Ref Range    Bilirubin Total 1.2 0.2 - 1.3 mg/dL    Bilirubin Direct 0.2 0.0 - 0.2 mg/dL    Protein Total 7.3 6.8 - 8.8 g/dL    Albumin 4.1 3.4 - 5.0 g/dL    Alkaline Phosphatase 81 40 - 150 U/L    AST 34 0 - 45 U/L    ALT 56 0 - 70 U/L      My impression is that Mr. Holley has chronic hepatitis B.  His transaminases remain normal, and his HBV DNA level < 20.    I will continue him on his  current dose of tenofovir.     We did discuss COVID-19.  I did point out that simply having hepatitis B does not put him at increased risk for more severe disease.  Nonetheless I did encourage him to get the vaccine once it is available. Both he and his wife did have COVID-19 last summer     Otherwise my plan to be to see him back in the clinic in 1 year.     Thank you very much for allowing me to participate in the care of this patient.  If you have any questions regarding my recommendations, please do not hesitate to contact me.         Tripp Corona MD      Professor of Medicine  [employer]      Executive Medical Director, Solid Organ Transplant Program  [facility]    Video-Visit Details    Type of service:  Video Visit    Video End Time:11:11 AM    Originating Location (pt. Location): Home    Distant Location (provider location):  [facility] HEPATOLOGY CLINIC [place]     Platform used for Video Visit: [employer]et

## 2022-05-15 ENCOUNTER — HEALTH MAINTENANCE LETTER (OUTPATIENT)
Age: 32
End: 2022-05-15

## 2022-09-11 ENCOUNTER — HEALTH MAINTENANCE LETTER (OUTPATIENT)
Age: 32
End: 2022-09-11

## 2023-05-26 ENCOUNTER — DOCUMENTATION ONLY (OUTPATIENT)
Dept: LAB | Facility: CLINIC | Age: 33
End: 2023-05-26

## 2023-05-26 DIAGNOSIS — B18.1 CHRONIC VIRAL HEPATITIS B WITHOUT DELTA AGENT AND WITHOUT COMA (H): Primary | ICD-10-CM

## 2023-05-30 ENCOUNTER — LAB (OUTPATIENT)
Dept: LAB | Facility: CLINIC | Age: 33
End: 2023-05-30

## 2023-05-30 DIAGNOSIS — B18.1 CHRONIC VIRAL HEPATITIS B WITHOUT DELTA AGENT AND WITHOUT COMA (H): ICD-10-CM

## 2023-05-30 LAB
AFP SERPL-MCNC: 1.9 NG/ML
ALBUMIN SERPL BCG-MCNC: 4.4 G/DL (ref 3.5–5.2)
ALP SERPL-CCNC: 78 U/L (ref 40–129)
ALT SERPL W P-5'-P-CCNC: 37 U/L (ref 10–50)
ANION GAP SERPL CALCULATED.3IONS-SCNC: 9 MMOL/L (ref 7–15)
AST SERPL W P-5'-P-CCNC: 31 U/L (ref 10–50)
BILIRUB DIRECT SERPL-MCNC: <0.2 MG/DL (ref 0–0.3)
BILIRUB SERPL-MCNC: 0.8 MG/DL
BUN SERPL-MCNC: 16.8 MG/DL (ref 6–20)
CALCIUM SERPL-MCNC: 9.2 MG/DL (ref 8.6–10)
CHLORIDE SERPL-SCNC: 103 MMOL/L (ref 98–107)
CREAT SERPL-MCNC: 0.95 MG/DL (ref 0.67–1.17)
DEPRECATED HCO3 PLAS-SCNC: 28 MMOL/L (ref 22–29)
ERYTHROCYTE [DISTWIDTH] IN BLOOD BY AUTOMATED COUNT: 11.6 % (ref 10–15)
GFR SERPL CREATININE-BSD FRML MDRD: >90 ML/MIN/1.73M2
GLUCOSE SERPL-MCNC: 102 MG/DL (ref 70–99)
HCT VFR BLD AUTO: 44 % (ref 40–53)
HGB BLD-MCNC: 15.3 G/DL (ref 13.3–17.7)
INR PPP: 1.05 (ref 0.85–1.15)
MCH RBC QN AUTO: 30.8 PG (ref 26.5–33)
MCHC RBC AUTO-ENTMCNC: 34.8 G/DL (ref 31.5–36.5)
MCV RBC AUTO: 89 FL (ref 78–100)
PLATELET # BLD AUTO: 177 10E3/UL (ref 150–450)
POTASSIUM SERPL-SCNC: 4 MMOL/L (ref 3.4–5.3)
PROT SERPL-MCNC: 7.3 G/DL (ref 6.4–8.3)
RBC # BLD AUTO: 4.97 10E6/UL (ref 4.4–5.9)
SODIUM SERPL-SCNC: 140 MMOL/L (ref 136–145)
WBC # BLD AUTO: 7.8 10E3/UL (ref 4–11)

## 2023-05-30 PROCEDURE — 85027 COMPLETE CBC AUTOMATED: CPT | Performed by: PATHOLOGY

## 2023-05-30 PROCEDURE — 85610 PROTHROMBIN TIME: CPT | Performed by: PATHOLOGY

## 2023-05-30 PROCEDURE — 80053 COMPREHEN METABOLIC PANEL: CPT | Performed by: PATHOLOGY

## 2023-05-30 PROCEDURE — 99000 SPECIMEN HANDLING OFFICE-LAB: CPT | Performed by: PATHOLOGY

## 2023-05-30 PROCEDURE — 36415 COLL VENOUS BLD VENIPUNCTURE: CPT | Performed by: PATHOLOGY

## 2023-05-30 PROCEDURE — 82248 BILIRUBIN DIRECT: CPT | Performed by: PATHOLOGY

## 2023-05-30 PROCEDURE — 87517 HEPATITIS B DNA QUANT: CPT | Mod: 90 | Performed by: PATHOLOGY

## 2023-05-30 PROCEDURE — 82105 ALPHA-FETOPROTEIN SERUM: CPT | Mod: 90 | Performed by: PATHOLOGY

## 2023-05-31 LAB
HBV DNA SERPL NAA+PROBE-ACNC: <10 IU/ML
HBV DNA SERPL NAA+PROBE-LOG IU: <1 {LOG_IU}/ML

## 2023-06-01 ENCOUNTER — OFFICE VISIT (OUTPATIENT)
Dept: GASTROENTEROLOGY | Facility: CLINIC | Age: 33
End: 2023-06-01
Attending: INTERNAL MEDICINE

## 2023-06-01 VITALS
BODY MASS INDEX: 23.97 KG/M2 | SYSTOLIC BLOOD PRESSURE: 109 MMHG | OXYGEN SATURATION: 99 % | HEART RATE: 78 BPM | DIASTOLIC BLOOD PRESSURE: 72 MMHG | WEIGHT: 153.06 LBS

## 2023-06-01 DIAGNOSIS — B18.1 CHRONIC VIRAL HEPATITIS B WITHOUT DELTA AGENT AND WITHOUT COMA (H): Primary | ICD-10-CM

## 2023-06-01 PROCEDURE — 99214 OFFICE O/P EST MOD 30 MIN: CPT | Performed by: INTERNAL MEDICINE

## 2023-06-01 PROCEDURE — G0463 HOSPITAL OUTPT CLINIC VISIT: HCPCS | Performed by: INTERNAL MEDICINE

## 2023-06-01 RX ORDER — TENOFOVIR DISOPROXIL FUMARATE 300 MG/1
300 TABLET, FILM COATED ORAL DAILY
Qty: 90 TABLET | Refills: 3 | Status: SHIPPED | OUTPATIENT
Start: 2023-06-01 | End: 2024-06-03

## 2023-06-01 ASSESSMENT — PAIN SCALES - GENERAL: PAINLEVEL: NO PAIN (0)

## 2023-06-01 NOTE — PROGRESS NOTES
HISTORY OF PRESENT ILLNESS:  I had the pleasure of seeing Saqib Holley for followup in the Liver Clinic at the [facility] on 06/01/2023.  Mr. Holley returns for followup of chronic hepatitis B.    He is doing well at this visit.  He denies any abdominal pain, itching, skin rash or fatigue.  He denies any increased abdominal girth or lower extremity edema.  He did have an episode of blood on his toilet tissue in April.  He was seen in the emergency room at [facility], and they really did not do much of anything, as his hemoglobin was fine and has not recurred.    He denies any fevers or chills, cough or shortness of breath.  He denies any nausea or vomiting, diarrhea or constipation.  His appetite has been good, and his weight has been stable.    There have been no other new events since he was last seen.    Current Outpatient Medications   Medication     lactobacillus rhamnosus, GG, (CULTURELL) capsule     magnesium 250 MG tablet     Multiple Vitamins-Minerals (MULTIVITAMIN ADULTS PO)     tenofovir (VIREAD) 300 MG tablet     vitamin D3 (CHOLECALCIFEROL) 2000 units tablet     No current facility-administered medications for this visit.     /72 (BP Location: Right arm, Patient Position: Sitting, Cuff Size: Adult Regular)   Pulse 78   Wt 69.4 kg (153 lb 1 oz)   SpO2 99%   BMI 23.97 kg/m      PHYSICAL EXAMINATION:    GENERAL:  He looks quite well.  HEENT:  No scleral icterus or temporal muscle wasting.  CHEST:  Clear.  ABDOMEN:  No increase in girth.  No masses or tenderness to palpation is present.  His liver is 10 cm in span without left lobe enlargement.  No spleen tip is palpable.  EXTREMITIES:  No edema.  SKIN:  No stigmata of chronic liver disease.  NEUROLOGIC:  No asterixis.    Recent Results (from the past 168 hour(s))   Hep B Virus DNA Quant Real Time PCR    Collection Time: 05/30/23  3:39 PM   Result Value Ref Range    Hepatitis B DNA IU/mL <10 (A) Not Detected  IU/mL    Hepatitis B log <1.0    AFP tumor marker    Collection Time: 05/30/23  3:39 PM   Result Value Ref Range    AFP tumor marker 1.9 <=8.3 ng/mL   INR    Collection Time: 05/30/23  3:39 PM   Result Value Ref Range    INR 1.05 0.85 - 1.15   Hepatic function panel    Collection Time: 05/30/23  3:39 PM   Result Value Ref Range    Protein Total 7.3 6.4 - 8.3 g/dL    Albumin 4.4 3.5 - 5.2 g/dL    Bilirubin Total 0.8 <=1.2 mg/dL    Alkaline Phosphatase 78 40 - 129 U/L    AST 31 10 - 50 U/L    ALT 37 10 - 50 U/L    Bilirubin Direct <0.20 0.00 - 0.30 mg/dL   Basic metabolic panel    Collection Time: 05/30/23  3:39 PM   Result Value Ref Range    Sodium 140 136 - 145 mmol/L    Potassium 4.0 3.4 - 5.3 mmol/L    Chloride 103 98 - 107 mmol/L    Carbon Dioxide (CO2) 28 22 - 29 mmol/L    Anion Gap 9 7 - 15 mmol/L    Urea Nitrogen 16.8 6.0 - 20.0 mg/dL    Creatinine 0.95 0.67 - 1.17 mg/dL    Calcium 9.2 8.6 - 10.0 mg/dL    Glucose 102 (H) 70 - 99 mg/dL    GFR Estimate >90 >60 mL/min/1.73m2   CBC with platelets    Collection Time: 05/30/23  3:39 PM   Result Value Ref Range    WBC Count 7.8 4.0 - 11.0 10e3/uL    RBC Count 4.97 4.40 - 5.90 10e6/uL    Hemoglobin 15.3 13.3 - 17.7 g/dL    Hematocrit 44.0 40.0 - 53.0 %    MCV 89 78 - 100 fL    MCH 30.8 26.5 - 33.0 pg    MCHC 34.8 31.5 - 36.5 g/dL    RDW 11.6 10.0 - 15.0 %    Platelet Count 177 150 - 450 10e3/uL      IMPRESSION:  Mr. Holley has chronic hepatitis B and is doing very well.  The virus is completely suppressed, and I have refilled his tenofovir.  There is no evidence of any tenofovir or renal toxicity.    I will not be making any change to his medical regimen.  I will see him back in the clinic in 1 year.    I did spend a total of 30 minutes (on the date of the encounter), including 20 minutes of face-to-face clinic time including counseling. The rest of the time was spent in documentation and review of records.     Thank you very much for allowing me to participate in  the care of this patient.  If you have any questions regarding recommendations, please do not hesitate to contact me.         Tripp Corona MD      Professor of Medicine  [employer]      Executive Medical Director, Solid Organ Transplant Program  [facility]

## 2023-06-03 ENCOUNTER — HEALTH MAINTENANCE LETTER (OUTPATIENT)
Age: 33
End: 2023-06-03

## 2024-06-01 DIAGNOSIS — B18.1 CHRONIC VIRAL HEPATITIS B WITHOUT DELTA AGENT AND WITHOUT COMA (H): ICD-10-CM

## 2024-06-03 RX ORDER — TENOFOVIR DISOPROXIL FUMARATE 300 MG/1
300 TABLET, FILM COATED ORAL DAILY
Qty: 90 TABLET | Refills: 3 | Status: SHIPPED | OUTPATIENT
Start: 2024-06-03

## 2024-06-04 ENCOUNTER — LAB (OUTPATIENT)
Dept: LAB | Facility: CLINIC | Age: 34
End: 2024-06-04
Attending: INTERNAL MEDICINE

## 2024-06-04 DIAGNOSIS — B18.1 CHRONIC VIRAL HEPATITIS B WITHOUT DELTA AGENT AND WITHOUT COMA (H): ICD-10-CM

## 2024-06-04 LAB
AFP SERPL-MCNC: 2.1 NG/ML
ALBUMIN SERPL BCG-MCNC: 4.6 G/DL (ref 3.5–5.2)
ALP SERPL-CCNC: 110 U/L (ref 40–150)
ALT SERPL W P-5'-P-CCNC: NORMAL U/L
ANION GAP SERPL CALCULATED.3IONS-SCNC: 12 MMOL/L (ref 7–15)
AST SERPL W P-5'-P-CCNC: NORMAL U/L
BILIRUB DIRECT SERPL-MCNC: NORMAL MG/DL
BILIRUB SERPL-MCNC: 0.7 MG/DL
BUN SERPL-MCNC: 20.3 MG/DL (ref 6–20)
CALCIUM SERPL-MCNC: 9.2 MG/DL (ref 8.6–10)
CHLORIDE SERPL-SCNC: 104 MMOL/L (ref 98–107)
CREAT SERPL-MCNC: 0.96 MG/DL (ref 0.67–1.17)
DEPRECATED HCO3 PLAS-SCNC: 25 MMOL/L (ref 22–29)
EGFRCR SERPLBLD CKD-EPI 2021: >90 ML/MIN/1.73M2
ERYTHROCYTE [DISTWIDTH] IN BLOOD BY AUTOMATED COUNT: 11.6 % (ref 10–15)
GLUCOSE SERPL-MCNC: 113 MG/DL (ref 70–99)
HCT VFR BLD AUTO: 44.4 % (ref 40–53)
HGB BLD-MCNC: 15.7 G/DL (ref 13.3–17.7)
INR PPP: 0.96 (ref 0.85–1.15)
MCH RBC QN AUTO: 31 PG (ref 26.5–33)
MCHC RBC AUTO-ENTMCNC: 35.4 G/DL (ref 31.5–36.5)
MCV RBC AUTO: 88 FL (ref 78–100)
PLATELET # BLD AUTO: 167 10E3/UL (ref 150–450)
POTASSIUM SERPL-SCNC: 4.1 MMOL/L (ref 3.4–5.3)
PROT SERPL-MCNC: 7.4 G/DL (ref 6.4–8.3)
RBC # BLD AUTO: 5.06 10E6/UL (ref 4.4–5.9)
SODIUM SERPL-SCNC: 141 MMOL/L (ref 135–145)
WBC # BLD AUTO: 6.5 10E3/UL (ref 4–11)

## 2024-06-04 PROCEDURE — 85027 COMPLETE CBC AUTOMATED: CPT | Performed by: PATHOLOGY

## 2024-06-04 PROCEDURE — 82247 BILIRUBIN TOTAL: CPT | Performed by: PATHOLOGY

## 2024-06-04 PROCEDURE — 85610 PROTHROMBIN TIME: CPT | Performed by: PATHOLOGY

## 2024-06-04 PROCEDURE — 84075 ASSAY ALKALINE PHOSPHATASE: CPT | Performed by: PATHOLOGY

## 2024-06-04 PROCEDURE — 99000 SPECIMEN HANDLING OFFICE-LAB: CPT | Performed by: PATHOLOGY

## 2024-06-04 PROCEDURE — 87517 HEPATITIS B DNA QUANT: CPT | Performed by: INTERNAL MEDICINE

## 2024-06-04 PROCEDURE — 80048 BASIC METABOLIC PNL TOTAL CA: CPT | Performed by: PATHOLOGY

## 2024-06-04 PROCEDURE — 84155 ASSAY OF PROTEIN SERUM: CPT | Performed by: PATHOLOGY

## 2024-06-04 PROCEDURE — 82040 ASSAY OF SERUM ALBUMIN: CPT | Performed by: PATHOLOGY

## 2024-06-04 PROCEDURE — 36415 COLL VENOUS BLD VENIPUNCTURE: CPT | Performed by: PATHOLOGY

## 2024-06-04 PROCEDURE — 82105 ALPHA-FETOPROTEIN SERUM: CPT | Performed by: INTERNAL MEDICINE

## 2024-06-05 LAB
HBV DNA SERPL NAA+PROBE-ACNC: <10 IU/ML
HBV DNA SERPL NAA+PROBE-LOG IU: <1 {LOG_IU}/ML

## 2024-06-06 ENCOUNTER — OFFICE VISIT (OUTPATIENT)
Dept: GASTROENTEROLOGY | Facility: CLINIC | Age: 34
End: 2024-06-06
Attending: INTERNAL MEDICINE

## 2024-06-06 VITALS
SYSTOLIC BLOOD PRESSURE: 121 MMHG | DIASTOLIC BLOOD PRESSURE: 80 MMHG | BODY MASS INDEX: 25.62 KG/M2 | WEIGHT: 163.6 LBS | HEART RATE: 72 BPM | TEMPERATURE: 98.7 F | OXYGEN SATURATION: 99 %

## 2024-06-06 DIAGNOSIS — B18.1 CHRONIC VIRAL HEPATITIS B WITHOUT DELTA AGENT AND WITHOUT COMA (H): Primary | ICD-10-CM

## 2024-06-06 PROCEDURE — 99214 OFFICE O/P EST MOD 30 MIN: CPT | Performed by: INTERNAL MEDICINE

## 2024-06-06 PROCEDURE — 99213 OFFICE O/P EST LOW 20 MIN: CPT | Performed by: INTERNAL MEDICINE

## 2024-06-06 ASSESSMENT — PAIN SCALES - GENERAL: PAINLEVEL: NO PAIN (0)

## 2024-06-06 NOTE — NURSING NOTE
Chief Complaint   Patient presents with    RECHECK      RETURN LIVER - Hep B // per pt // no outside MR       /80 (BP Location: Right arm, Patient Position: Sitting, Cuff Size: Adult Regular)   Pulse 72   Temp 98.7  F (37.1  C) (Oral)   Wt 74.2 kg (163 lb 9.6 oz)   SpO2 99%   BMI 25.62 kg/m      Bill Recinos CMA on 6/6/2024 at 9:51 AM

## 2024-06-06 NOTE — PROGRESS NOTES
Hepatology Follow-Up Visit:     HISTORY OF PRESENT ILLNESS:   I had the pleasure of seeing Saqib Holley for followup in the Liver Clinic at the [facility] on June 6, 2024. Mr. Holley returns for followup of chronic hepatitis B.     He is doing well at this visit.  He denies any abdominal pain, itching, skin rash or fatigue.  He denies any increased abdominal girth or lower extremity edema.  He did have an episode of blood on his toilet tissue in April.  He last had blood in his bowel movements 2 years ago.     He denies any fevers or chills, cough or shortness of breath.  He denies any nausea or vomiting, diarrhea or constipation.  His appetite has been good, and his weight has been stable.     There have been no other new events since he was last seen.    Medications:   Current Outpatient Medications   Medication Sig Dispense Refill    Multiple Vitamins-Minerals (MULTIVITAMIN ADULTS PO) Take 1 tablet by mouth daily      tenofovir (VIREAD) 300 MG tablet TAKE ONE TABLET BY MOUTH EVERY DAY 90 tablet 3    lactobacillus rhamnosus, GG, (CULTURELL) capsule Take 1 capsule by mouth 2 times daily      magnesium 250 MG tablet Take 1 tablet by mouth daily      vitamin D3 (CHOLECALCIFEROL) 2000 units tablet Take 1 tablet by mouth daily (Patient not taking: Reported on 6/1/2023)       No current facility-administered medications for this visit.        Vitals:   /80 (BP Location: Right arm, Patient Position: Sitting, Cuff Size: Adult Regular)   Pulse 72   Temp 98.7  F (37.1  C) (Oral)   Wt 74.2 kg (163 lb 9.6 oz)   SpO2 99%   BMI 25.62 kg/m      Physical Exam:   In general he looks quite well. HEENT exam shows no scleral icterus or temporal muscle wasting. Chest is clear. Abdominal exam shows no increase in girth. No masses or tenderness to palpation are present. Liver is 10 cm in span without left lobe enlargement. No spleen tip is palpable. Extremity exam shows no edema. Skin exam  shows no stigmata of chronic liver disease. Neurologic exam shows no asterixis.     Labs:   Lab Results   Component Value Date     06/04/2024    POTASSIUM 4.1 06/04/2024    CHLORIDE 104 06/04/2024    ANIONGAP 12 06/04/2024    CO2 25 06/04/2024    BUN 20.3 (H) 06/04/2024    CR 0.96 06/04/2024    GFRESTIMATED >90 06/04/2024    RAJESH 9.2 06/04/2024      Lab Results   Component Value Date    WBC 6.5 06/04/2024    HGB 15.7 06/04/2024    HCT 44.4 06/04/2024    MCV 88 06/04/2024    MCH 31.0 06/04/2024    MCHC 35.4 06/04/2024    RDW 11.6 06/04/2024     06/04/2024     Lab Results   Component Value Date    ALBUMIN 4.6 06/04/2024    ALKPHOS 110 06/04/2024    AST  06/04/2024      Comment:      Unsatisfactory specimen - lipemic  Reference intervals for this test were updated on 6/12/2023 to more accurately reflect our healthy population. There may be differences in the flagging of prior results with similar values performed with this method. Interpretation of those prior results can be made in the context of the updated reference intervals.     Lab Results   Component Value Date    INR 0.96 06/04/2024       MELD 3.0: 6 at 6/4/2024  9:21 AM  MELD-Na: 6 at 6/4/2024  9:21 AM  Calculated from:  Serum Creatinine: 0.96 mg/dL (Using min of 1 mg/dL) at 6/4/2024  9:21 AM  Serum Sodium: 141 mmol/L (Using max of 137 mmol/L) at 6/4/2024  9:21 AM  Total Bilirubin: 0.7 mg/dL (Using min of 1 mg/dL) at 6/4/2024  9:21 AM  Serum Albumin: 4.6 g/dL (Using max of 3.5 g/dL) at 6/4/2024  9:21 AM  INR(ratio): 0.96 (Using min of 1) at 6/4/2024  9:21 AM  Age at listing (hypothetical): 34 years  Sex: Male at 6/4/2024  9:21 AM    Imaging:   No images are attached to the encounter.     Assessment/Plan:   IMPRESSION:   Mr. Holley has chronic hepatitis B and is doing very well.  The virus is completely suppressed, and I have refilled his tenofovir.  There is no evidence of any tenofovir or renal toxicity.     I will not be making any change to  his medical regimen.  I will see him back in the clinic in 1 year.     I did spend a total of 30 minutes (on the date of the encounter), including 20 minutes of face-to-face clinic time including counseling. The rest of the time was spent in documentation and review of records.    Thank you very much for allowing me to participate in the care of this patient.  If you have any questions regarding my recommendations, please do not hesitate to contact me.      Sincerely,       Tripp Corona MD      Professor of Medicine  [employer]      Executive Medical Director, Solid Organ Transplant Program  [facility]

## 2024-06-06 NOTE — LETTER
6/6/2024      Saqib Holley  [address]  [place] [address]      Dear Colleague,    Thank you for referring your patient, Saqib Holley, to the [facility] [place]. Please see a copy of my visit note below.    Hepatology Follow-Up Visit:     HISTORY OF PRESENT ILLNESS:   I had the pleasure of seeing Saqib Holley for followup in the Liver Clinic at the [facility] on June 6, 2024. Mr. Holley returns for followup of chronic hepatitis B.     He is doing well at this visit.  He denies any abdominal pain, itching, skin rash or fatigue.  He denies any increased abdominal girth or lower extremity edema.  He did have an episode of blood on his toilet tissue in April.  He last had blood in his bowel movements 2 years ago.     He denies any fevers or chills, cough or shortness of breath.  He denies any nausea or vomiting, diarrhea or constipation.  His appetite has been good, and his weight has been stable.     There have been no other new events since he was last seen.    Medications:   Current Outpatient Medications   Medication Sig Dispense Refill     Multiple Vitamins-Minerals (MULTIVITAMIN ADULTS PO) Take 1 tablet by mouth daily       tenofovir (VIREAD) 300 MG tablet TAKE ONE TABLET BY MOUTH EVERY DAY 90 tablet 3     lactobacillus rhamnosus, GG, (CULTURELL) capsule Take 1 capsule by mouth 2 times daily       magnesium 250 MG tablet Take 1 tablet by mouth daily       vitamin D3 (CHOLECALCIFEROL) 2000 units tablet Take 1 tablet by mouth daily (Patient not taking: Reported on 6/1/2023)       No current facility-administered medications for this visit.        Vitals:   /80 (BP Location: Right arm, Patient Position: Sitting, Cuff Size: Adult Regular)   Pulse 72   Temp 98.7  F (37.1  C) (Oral)   Wt 74.2 kg (163 lb 9.6 oz)   SpO2 99%   BMI 25.62 kg/m      Physical Exam:   In general he looks quite well. HEENT exam shows no scleral icterus  or temporal muscle wasting. Chest is clear. Abdominal exam shows no increase in girth. No masses or tenderness to palpation are present. Liver is 10 cm in span without left lobe enlargement. No spleen tip is palpable. Extremity exam shows no edema. Skin exam shows no stigmata of chronic liver disease. Neurologic exam shows no asterixis.     Labs:   Lab Results   Component Value Date     06/04/2024    POTASSIUM 4.1 06/04/2024    CHLORIDE 104 06/04/2024    ANIONGAP 12 06/04/2024    CO2 25 06/04/2024    BUN 20.3 (H) 06/04/2024    CR 0.96 06/04/2024    GFRESTIMATED >90 06/04/2024    RAJESH 9.2 06/04/2024      Lab Results   Component Value Date    WBC 6.5 06/04/2024    HGB 15.7 06/04/2024    HCT 44.4 06/04/2024    MCV 88 06/04/2024    MCH 31.0 06/04/2024    MCHC 35.4 06/04/2024    RDW 11.6 06/04/2024     06/04/2024     Lab Results   Component Value Date    ALBUMIN 4.6 06/04/2024    ALKPHOS 110 06/04/2024    AST  06/04/2024      Comment:      Unsatisfactory specimen - lipemic  Reference intervals for this test were updated on 6/12/2023 to more accurately reflect our healthy population. There may be differences in the flagging of prior results with similar values performed with this method. Interpretation of those prior results can be made in the context of the updated reference intervals.     Lab Results   Component Value Date    INR 0.96 06/04/2024       MELD 3.0: 6 at 6/4/2024  9:21 AM  MELD-Na: 6 at 6/4/2024  9:21 AM  Calculated from:  Serum Creatinine: 0.96 mg/dL (Using min of 1 mg/dL) at 6/4/2024  9:21 AM  Serum Sodium: 141 mmol/L (Using max of 137 mmol/L) at 6/4/2024  9:21 AM  Total Bilirubin: 0.7 mg/dL (Using min of 1 mg/dL) at 6/4/2024  9:21 AM  Serum Albumin: 4.6 g/dL (Using max of 3.5 g/dL) at 6/4/2024  9:21 AM  INR(ratio): 0.96 (Using min of 1) at 6/4/2024  9:21 AM  Age at listing (hypothetical): 34 years  Sex: Male at 6/4/2024  9:21 AM    Imaging:   No images are attached to the encounter.      Assessment/Plan:   IMPRESSION:   Mr. Holley has chronic hepatitis B and is doing very well.  The virus is completely suppressed, and I have refilled his tenofovir.  There is no evidence of any tenofovir or renal toxicity.     I will not be making any change to his medical regimen.  I will see him back in the clinic in 1 year.     I did spend a total of 30 minutes (on the date of the encounter), including 20 minutes of face-to-face clinic time including counseling. The rest of the time was spent in documentation and review of records.    Thank you very much for allowing me to participate in the care of this patient.  If you have any questions regarding my recommendations, please do not hesitate to contact me.      Sincerely,       Tripp Corona MD      Professor of Medicine  [employer]      Executive Medical Director, Solid Organ Transplant Program  [facility]        Again, thank you for allowing me to participate in the care of your patient.        Sincerely,        Tripp Corona MD

## 2024-07-07 ENCOUNTER — HEALTH MAINTENANCE LETTER (OUTPATIENT)
Age: 34
End: 2024-07-07

## 2025-04-02 NOTE — LETTER
6/1/2023         RE: Saqib Holley  [address]  [place] [address]        Dear Colleague,    Thank you for referring your patient, Saqib Holley, to the [facility]PAT[facility] [place]. Please see a copy of my visit note below.    HISTORY OF PRESENT ILLNESS:  I had the pleasure of seeing Saqib Holley for followup in the Liver Clinic at the [facility] on 06/01/2023.  Mr. Holley returns for followup of chronic hepatitis B.    He is doing well at this visit.  He denies any abdominal pain, itching, skin rash or fatigue.  He denies any increased abdominal girth or lower extremity edema.  He did have an episode of blood on his toilet tissue in April.  He was seen in the emergency room at [facility], and they really did not do much of anything, as his hemoglobin was fine and has not recurred.    He denies any fevers or chills, cough or shortness of breath.  He denies any nausea or vomiting, diarrhea or constipation.  His appetite has been good, and his weight has been stable.    There have been no other new events since he was last seen.    Current Outpatient Medications   Medication     lactobacillus rhamnosus, GG, (CULTURELL) capsule     magnesium 250 MG tablet     Multiple Vitamins-Minerals (MULTIVITAMIN ADULTS PO)     tenofovir (VIREAD) 300 MG tablet     vitamin D3 (CHOLECALCIFEROL) 2000 units tablet     No current facility-administered medications for this visit.     /72 (BP Location: Right arm, Patient Position: Sitting, Cuff Size: Adult Regular)   Pulse 78   Wt 69.4 kg (153 lb 1 oz)   SpO2 99%   BMI 23.97 kg/m      PHYSICAL EXAMINATION:    GENERAL:  He looks quite well.  HEENT:  No scleral icterus or temporal muscle wasting.  CHEST:  Clear.  ABDOMEN:  No increase in girth.  No masses or tenderness to palpation is present.  His liver is 10 cm in span without left lobe enlargement.  No spleen tip is palpable.  EXTREMITIES:  No  LVM to reschedule visit with Maricruz to  a sooner visit with audio prior. Can go into [facility]OR[facility] or noon slot if someone isnt there yet      Gave ENT number   edema.  SKIN:  No stigmata of chronic liver disease.  NEUROLOGIC:  No asterixis.    Recent Results (from the past 168 hour(s))   Hep B Virus DNA Quant Real Time PCR    Collection Time: 05/30/23  3:39 PM   Result Value Ref Range    Hepatitis B DNA IU/mL <10 (A) Not Detected IU/mL    Hepatitis B log <1.0    AFP tumor marker    Collection Time: 05/30/23  3:39 PM   Result Value Ref Range    AFP tumor marker 1.9 <=8.3 ng/mL   INR    Collection Time: 05/30/23  3:39 PM   Result Value Ref Range    INR 1.05 0.85 - 1.15   Hepatic function panel    Collection Time: 05/30/23  3:39 PM   Result Value Ref Range    Protein Total 7.3 6.4 - 8.3 g/dL    Albumin 4.4 3.5 - 5.2 g/dL    Bilirubin Total 0.8 <=1.2 mg/dL    Alkaline Phosphatase 78 40 - 129 U/L    AST 31 10 - 50 U/L    ALT 37 10 - 50 U/L    Bilirubin Direct <0.20 0.00 - 0.30 mg/dL   Basic metabolic panel    Collection Time: 05/30/23  3:39 PM   Result Value Ref Range    Sodium 140 136 - 145 mmol/L    Potassium 4.0 3.4 - 5.3 mmol/L    Chloride 103 98 - 107 mmol/L    Carbon Dioxide (CO2) 28 22 - 29 mmol/L    Anion Gap 9 7 - 15 mmol/L    Urea Nitrogen 16.8 6.0 - 20.0 mg/dL    Creatinine 0.95 0.67 - 1.17 mg/dL    Calcium 9.2 8.6 - 10.0 mg/dL    Glucose 102 (H) 70 - 99 mg/dL    GFR Estimate >90 >60 mL/min/1.73m2   CBC with platelets    Collection Time: 05/30/23  3:39 PM   Result Value Ref Range    WBC Count 7.8 4.0 - 11.0 10e3/uL    RBC Count 4.97 4.40 - 5.90 10e6/uL    Hemoglobin 15.3 13.3 - 17.7 g/dL    Hematocrit 44.0 40.0 - 53.0 %    MCV 89 78 - 100 fL    MCH 30.8 26.5 - 33.0 pg    MCHC 34.8 31.5 - 36.5 g/dL    RDW 11.6 10.0 - 15.0 %    Platelet Count 177 150 - 450 10e3/uL      IMPRESSION:  Mr. Holley has chronic hepatitis B and is doing very well.  The virus is completely suppressed, and I have refilled his tenofovir.  There is no evidence of any tenofovir or renal toxicity.    I will not be making any change to his medical regimen.  I will see him back in the clinic in 1  year.    I did spend a total of 30 minutes (on the date of the encounter), including 20 minutes of face-to-face clinic time including counseling. The rest of the time was spent in documentation and review of records.     Thank you very much for allowing me to participate in the care of this patient.  If you have any questions regarding recommendations, please do not hesitate to contact me.         Tripp Corona MD      Professor of Medicine  [employer]      Executive Medical Director, Solid Organ Transplant Program  [facility]      Again, thank you for allowing me to participate in the care of your patient.        Sincerely,        Tripp Corona MD

## 2025-05-21 ENCOUNTER — MYC REFILL (OUTPATIENT)
Dept: GASTROENTEROLOGY | Facility: CLINIC | Age: 35
End: 2025-05-21

## 2025-05-21 DIAGNOSIS — B18.1 CHRONIC VIRAL HEPATITIS B WITHOUT DELTA AGENT AND WITHOUT COMA (H): ICD-10-CM

## 2025-05-21 RX ORDER — TENOFOVIR DISOPROXIL FUMARATE 300 MG/1
300 TABLET, FILM COATED ORAL DAILY
Qty: 90 TABLET | Refills: 0 | Status: SHIPPED | OUTPATIENT
Start: 2025-05-21